# Patient Record
Sex: FEMALE | Race: WHITE | NOT HISPANIC OR LATINO
[De-identification: names, ages, dates, MRNs, and addresses within clinical notes are randomized per-mention and may not be internally consistent; named-entity substitution may affect disease eponyms.]

---

## 2017-02-10 ENCOUNTER — APPOINTMENT (OUTPATIENT)
Dept: DERMATOLOGY | Facility: CLINIC | Age: 22
End: 2017-02-10

## 2017-02-10 VITALS
SYSTOLIC BLOOD PRESSURE: 100 MMHG | HEIGHT: 64 IN | BODY MASS INDEX: 24.75 KG/M2 | DIASTOLIC BLOOD PRESSURE: 50 MMHG | WEIGHT: 145 LBS

## 2017-03-20 ENCOUNTER — MEDICATION RENEWAL (OUTPATIENT)
Age: 22
End: 2017-03-20

## 2017-04-06 ENCOUNTER — APPOINTMENT (OUTPATIENT)
Dept: DERMATOLOGY | Facility: CLINIC | Age: 22
End: 2017-04-06

## 2017-04-06 DIAGNOSIS — Z78.9 OTHER SPECIFIED HEALTH STATUS: ICD-10-CM

## 2017-04-06 RX ORDER — DOXYCYCLINE HYCLATE 100 MG/1
100 CAPSULE ORAL
Qty: 1 | Refills: 2 | Status: DISCONTINUED | COMMUNITY
Start: 2017-02-10 | End: 2017-04-06

## 2017-05-18 ENCOUNTER — RX RENEWAL (OUTPATIENT)
Age: 22
End: 2017-05-18

## 2017-05-19 ENCOUNTER — APPOINTMENT (OUTPATIENT)
Dept: INTERNAL MEDICINE | Facility: CLINIC | Age: 22
End: 2017-05-19

## 2017-05-19 VITALS
SYSTOLIC BLOOD PRESSURE: 100 MMHG | DIASTOLIC BLOOD PRESSURE: 60 MMHG | WEIGHT: 144 LBS | HEIGHT: 64 IN | BODY MASS INDEX: 24.59 KG/M2

## 2017-05-19 DIAGNOSIS — K92.1 MELENA: ICD-10-CM

## 2017-06-30 ENCOUNTER — APPOINTMENT (OUTPATIENT)
Dept: DERMATOLOGY | Facility: CLINIC | Age: 22
End: 2017-06-30

## 2017-06-30 VITALS — SYSTOLIC BLOOD PRESSURE: 110 MMHG | WEIGHT: 145 LBS | DIASTOLIC BLOOD PRESSURE: 62 MMHG

## 2017-06-30 DIAGNOSIS — L73.0 ACNE KELOID: ICD-10-CM

## 2017-06-30 RX ORDER — NORGESTIMATE AND ETHINYL ESTRADIOL 0.25-0.035
0.25-35 KIT ORAL
Qty: 28 | Refills: 0 | Status: ACTIVE | COMMUNITY
Start: 2017-02-17

## 2017-07-03 ENCOUNTER — EMERGENCY (EMERGENCY)
Facility: HOSPITAL | Age: 22
LOS: 1 days | Discharge: HOME | End: 2017-07-03
Attending: EMERGENCY MEDICINE | Admitting: EMERGENCY MEDICINE
Payer: COMMERCIAL

## 2017-07-03 ENCOUNTER — APPOINTMENT (OUTPATIENT)
Dept: INTERNAL MEDICINE | Facility: CLINIC | Age: 22
End: 2017-07-03

## 2017-07-03 VITALS
DIASTOLIC BLOOD PRESSURE: 70 MMHG | WEIGHT: 142 LBS | SYSTOLIC BLOOD PRESSURE: 102 MMHG | HEIGHT: 64 IN | HEART RATE: 90 BPM | TEMPERATURE: 98.9 F | BODY MASS INDEX: 24.24 KG/M2

## 2017-07-03 VITALS
SYSTOLIC BLOOD PRESSURE: 137 MMHG | DIASTOLIC BLOOD PRESSURE: 83 MMHG | OXYGEN SATURATION: 95 % | RESPIRATION RATE: 20 BRPM | HEART RATE: 114 BPM

## 2017-07-03 VITALS
DIASTOLIC BLOOD PRESSURE: 65 MMHG | OXYGEN SATURATION: 98 % | TEMPERATURE: 98 F | RESPIRATION RATE: 18 BRPM | SYSTOLIC BLOOD PRESSURE: 112 MMHG | HEART RATE: 86 BPM

## 2017-07-03 DIAGNOSIS — J06.9 ACUTE UPPER RESPIRATORY INFECTION, UNSPECIFIED: ICD-10-CM

## 2017-07-03 PROCEDURE — 99283 EMERGENCY DEPT VISIT LOW MDM: CPT | Mod: 25

## 2017-07-03 RX ORDER — DIPHENHYDRAMINE HCL 50 MG
25 CAPSULE ORAL ONCE
Refills: 0 | Status: COMPLETED | OUTPATIENT
Start: 2017-07-03 | End: 2017-07-03

## 2017-07-03 RX ORDER — IBUPROFEN 200 MG
400 TABLET ORAL ONCE
Refills: 0 | Status: COMPLETED | OUTPATIENT
Start: 2017-07-03 | End: 2017-07-03

## 2017-07-03 RX ADMIN — Medication 400 MILLIGRAM(S): at 03:32

## 2017-07-03 RX ADMIN — Medication 25 MILLIGRAM(S): at 03:32

## 2017-07-03 NOTE — ED PROVIDER NOTE - PHYSICAL EXAMINATION
Gen: NAD  Eyes:  sclerae white, no icterus  ENT: Moist mucous membranes. No exudates, clear oropharynx, mallampati 1  Neck: supple, no LAD, mass or goiter, trachea midline  CV: RRR. Audible S1 and S2. No murmurs, rubs, gallops, S3, nor S4  Pulm: Clear to auscultation bilaterally. No wheezes, rales, or rhonchi  Abd: BS+, nondistended, No tenderness to palpation  Musculoskeletal:  No edema  Skin: no lesions or scars noted  Psych: Anxious  Neurologic: AAOx3

## 2017-07-03 NOTE — ED ADULT NURSE NOTE - OBJECTIVE STATEMENT
Pt presents to ED awake, alert and ambulatory, accompanied by her  and her mother d/t difficulty swallowing. Pt states around 8pm yesterday, she took her birth control pill in a rush and with a tiny bit of water and afterwards felt like it was stuck in her throat. Pt states she hates taking pills and immediately afterwards felt very anxious and reportedly had a "panic attack." Pt denies respiratory distress at any time. Pt has been drinking warm liquid since without coughing or choking. Pt has normal skin color for race and respirations even and unlabored. Pt feels like her throat is "numb." No foreign body noted in throat. No PMH.

## 2017-07-03 NOTE — ED PROVIDER NOTE - ATTENDING CONTRIBUTION TO CARE
21yo F here w c/o painful swallowing. Accompanied by her  and her mother. Pt states around 8pm last night took her birth control pill with a tiny sip of water and afterwards felt like it was stuck in her throat. Here c/o sensation of something "stuck in her throat" and "unable to swallow" however, has been drinking warm liquid since and is tolerating. No sob, resp distress. No coughing or choking. On exam anxious but well appearing in NAD NCAT normal pharynx, uvula midline, no tonsillar hypertrophy or exudate. Neck supple, No thyromegaly. No LAD, RRR, no murmur, CTa BL. neuro intact. AP: 21yo F here with FB sensation in throat after swallowing her OCP earlier this evening. She is in no distress. Breathing comfortably. Tolerating PO at home and tolerating her own secretions. PO challenge. Reassurance. FU PCP.

## 2017-07-03 NOTE — ED PROVIDER NOTE - OBJECTIVE STATEMENT
21 y/o F p/w odynophagia; swallowed birth control pill earlier and pill feels stuck in her throat. Able to swallow and tolerate PO w/out difficulty. Started on Z- pack 3 days ago for nasal congestion.

## 2017-07-03 NOTE — ED PROVIDER NOTE - MEDICAL DECISION MAKING DETAILS
Odynophagia after swallowing birth control pill. Clear oropharynx. Given motrin, popsicle, and benadryl for anxiety. Odynophagia after swallowing birth control pill. Clear oropharynx. Tolerating secretions. Airway intact. No resp distress. Given motrin for pain, tolerated popsicle in ED, pt requested benadryl to help "relax." DC [facility] PCP.   Ana Cristina: See attending statement below

## 2017-07-04 ENCOUNTER — EMERGENCY (EMERGENCY)
Facility: HOSPITAL | Age: 22
LOS: 1 days | Discharge: HOME | End: 2017-07-04
Attending: EMERGENCY MEDICINE | Admitting: EMERGENCY MEDICINE
Payer: COMMERCIAL

## 2017-07-04 VITALS
TEMPERATURE: 99 F | SYSTOLIC BLOOD PRESSURE: 106 MMHG | HEART RATE: 69 BPM | OXYGEN SATURATION: 98 % | DIASTOLIC BLOOD PRESSURE: 76 MMHG | RESPIRATION RATE: 20 BRPM

## 2017-07-04 PROCEDURE — 99283 EMERGENCY DEPT VISIT LOW MDM: CPT | Mod: 25

## 2017-07-04 NOTE — ED PROVIDER NOTE - PLAN OF CARE
1) Please follow-up with your Primary Medical Doctor in 3-5 days. If you need to find a new physician, please call [phone]. If the symptoms continue, please see the ENT and you can call them at [phone].  2) Return to the Emergency Department if you experiences: fevers, chills, vomiting, or symptoms that are new or recurrent.  3) If you have any questions or concerns, do not hesitate to contact us at [phone].

## 2017-07-04 NOTE — ED ADULT NURSE NOTE - OBJECTIVE STATEMENT
21 yo female pt presents to ed complaining of difficulty swallowing for two days. as per pt "I was driving on sunday and swallowed my birth control pill with some water and I feel like it has been lodged in there since. I am scared to swallow and I feel like something is in my throat and I have only been eating liquids and soft foods". airway patent. no redness noted to throat. no angioedema. pt appears to be in no respiratory distress. speech clear. breath sounds clear and equal bilaterally. pt appear anxious. pt denies pain/numbness/tingling/sob/chest pain/n/v. abd nontender and nondistended. skin warm dry and intact.

## 2017-07-04 NOTE — ED ADULT NURSE NOTE - CHPI ED SYMPTOMS NEG
no numbness/no vomiting/no nausea/no tingling/no weakness/no chills/no decreased eating/drinking/no dizziness/no fever/no pain

## 2017-07-04 NOTE — ED PROVIDER NOTE - ATTENDING CONTRIBUTION TO CARE
Attending MD Gaines:  I personally have seen and examined this patient.  Resident note reviewed and agree on plan of care and except where noted.  See HPI, PE, and MDM for details.     Attending MD Gaines: A & O x 3, NAD, HEENT WNL and no facial asymmetry; lungs CTAB, heart with reg rhythm without murmur; abdomen soft NTND; extremities with no edema; neuro exam non focal with no motor or sensory deficits.       22F presenting with persistent odynophagia and difficulty swallowing, patient thinks there is a pill stuck in her throat. No e/o complete impaction, likely pill esophagitis vs anxiety as patient is quite anxious. Airway clear, no stridor, tolerating PO. Will tx with pepcid, maalox, GI outpatient f/u

## 2017-07-04 NOTE — ED PROVIDER NOTE - CARE PLAN
Instructions for follow-up, activity and diet:	1) Please follow-up with your Primary Medical Doctor in 3-5 days. If you need to find a new physician, please call [phone]. If the symptoms continue, please see the ENT and you can call them at [phone].  2) Return to the Emergency Department if you experiences: fevers, chills, vomiting, or symptoms that are new or recurrent.  3) If you have any questions or concerns, do not hesitate to contact us at [phone]. Principal Discharge DX:	Pill esophagitis  Instructions for follow-up, activity and diet:	1) Please follow-up with your Primary Medical Doctor in 3-5 days. If you need to find a new physician, please call [phone]. If the symptoms continue, please see the ENT and you can call them at [phone].  2) Return to the Emergency Department if you experiences: fevers, chills, vomiting, or symptoms that are new or recurrent.  3) If you have any questions or concerns, do not hesitate to contact us at [phone]. Principal Discharge DX:	Pill esophagitis  Instructions for follow-up, activity and diet:	1) Please follow-up with your Primary Medical Doctor in 3-5 days. If you need to find a new physician, please call [phone]. If the symptoms continue, please see the ENT and you can call them at [phone].  2) Return to the Emergency Department if you experiences: fevers, chills, vomiting, or symptoms that are new or recurrent.  3) If you have any questions or concerns, do not hesitate to contact us at [phone]. Principal Discharge DX:	Pill esophagitis  Instructions for follow-up, activity and diet:	1) Please follow-up with your Primary Medical Doctor in 3-5 days. If you need to find a new physician, please call [phone]. If the symptoms continue, please see the ENT and you can call them at [phone].  2) Return to the Emergency Department if you experiences: fevers, chills, vomiting, or symptoms that are new or recurrent.  3) If you have any questions or concerns, do not hesitate to contact us at [phone].

## 2017-07-04 NOTE — ED PROVIDER NOTE - MEDICAL DECISION MAKING DETAILS
Villa Rodriguez MD (resident): 22 F w/ diff swallowing after ingesting an OCP, able to tolerate solids and liquids with discomfort. Protecting airway, no sign of airway narrowing or swelling. Unable to visualize any lesions of upper oropharynx. Reassurance to patient and family members.

## 2017-07-04 NOTE — ED PROVIDER NOTE - NS ED ROS FT
Review of Systems: No fever, no chest pain, no shortness of breath, + throat discomfort. ~ Villa Rodriguez MD

## 2017-07-04 NOTE — ED PROVIDER NOTE - PHYSICAL EXAMINATION
Physical Exam: young F who is anxious-appearing, tearful in the room, AAOx3, NCAT, MMM, no oropharyngeal exudates, signs of trauma or swelling, no stridor, neck is supple, PERRL, CTAB, normal rate and regular rhythm,  No deformity of extremities, No rashes, CN grossly intact, No focal motor or sensory deficits. ~ Villa Rodriguez MD

## 2017-07-04 NOTE — ED PROVIDER NOTE - OBJECTIVE STATEMENT
3 days ago pt swallowed a birth control pill and since then has been having foreign body sensation in the through, and discomfort with swallowing both solids and liquids, but is able to tolerate eating and drinking w/o vomiting, cough, drooling. No voice changes, no SOB.

## 2017-07-05 VITALS
SYSTOLIC BLOOD PRESSURE: 101 MMHG | HEART RATE: 69 BPM | OXYGEN SATURATION: 100 % | TEMPERATURE: 98 F | DIASTOLIC BLOOD PRESSURE: 67 MMHG | RESPIRATION RATE: 20 BRPM

## 2017-07-05 RX ORDER — FAMOTIDINE 10 MG/ML
20 INJECTION INTRAVENOUS ONCE
Refills: 0 | Status: DISCONTINUED | OUTPATIENT
Start: 2017-07-05 | End: 2017-07-05

## 2017-07-05 RX ORDER — ACETAMINOPHEN 500 MG
650 TABLET ORAL ONCE
Refills: 0 | Status: COMPLETED | OUTPATIENT
Start: 2017-07-05 | End: 2017-07-05

## 2017-07-05 RX ORDER — FAMOTIDINE 10 MG/ML
5 INJECTION INTRAVENOUS
Qty: 25 | Refills: 0
Start: 2017-07-05 | End: 2017-07-10

## 2017-07-05 RX ADMIN — Medication 30 MILLILITER(S): at 00:28

## 2017-07-05 RX ADMIN — Medication 650 MILLIGRAM(S): at 00:28

## 2017-07-20 ENCOUNTER — APPOINTMENT (OUTPATIENT)
Dept: INTERNAL MEDICINE | Facility: CLINIC | Age: 22
End: 2017-07-20

## 2017-07-26 ENCOUNTER — APPOINTMENT (OUTPATIENT)
Dept: ENDOCRINOLOGY | Facility: CLINIC | Age: 22
End: 2017-07-26

## 2017-07-26 VITALS
OXYGEN SATURATION: 99 % | HEIGHT: 64 IN | DIASTOLIC BLOOD PRESSURE: 60 MMHG | HEART RATE: 93 BPM | BODY MASS INDEX: 25.61 KG/M2 | WEIGHT: 150 LBS | SYSTOLIC BLOOD PRESSURE: 110 MMHG

## 2017-08-01 ENCOUNTER — APPOINTMENT (OUTPATIENT)
Dept: OTOLARYNGOLOGY | Facility: CLINIC | Age: 22
End: 2017-08-01

## 2017-10-18 ENCOUNTER — TRANSCRIPTION ENCOUNTER (OUTPATIENT)
Age: 22
End: 2017-10-18

## 2017-11-21 ENCOUNTER — APPOINTMENT (OUTPATIENT)
Dept: DERMATOLOGY | Facility: CLINIC | Age: 22
End: 2017-11-21

## 2017-12-13 ENCOUNTER — APPOINTMENT (OUTPATIENT)
Dept: DERMATOLOGY | Facility: CLINIC | Age: 22
End: 2017-12-13
Payer: MEDICAID

## 2017-12-13 DIAGNOSIS — L21.9 SEBORRHEIC DERMATITIS, UNSPECIFIED: ICD-10-CM

## 2017-12-13 DIAGNOSIS — B00.1 HERPESVIRAL VESICULAR DERMATITIS: ICD-10-CM

## 2017-12-13 PROCEDURE — 99213 OFFICE O/P EST LOW 20 MIN: CPT

## 2017-12-13 RX ORDER — CLINDAMYCIN PHOSPHATE AND TRETINOIN 10; .25 MG/G; MG/G
1.2-0.025 GEL TOPICAL
Qty: 1 | Refills: 6 | Status: COMPLETED | COMMUNITY
Start: 2017-04-06 | End: 2017-12-13

## 2017-12-13 RX ORDER — KETOCONAZOLE 20.5 MG/ML
2 SHAMPOO, SUSPENSION TOPICAL
Qty: 1 | Refills: 5 | Status: ACTIVE | COMMUNITY
Start: 2017-12-13 | End: 1900-01-01

## 2017-12-13 RX ORDER — TRETINOIN 0.25 MG/G
0.03 CREAM TOPICAL
Qty: 1 | Refills: 3 | Status: COMPLETED | COMMUNITY
Start: 2017-02-10 | End: 2017-12-13

## 2017-12-13 RX ORDER — CLINDAMYCIN PHOSPHATE 1 G/10ML
1 GEL TOPICAL
Qty: 1 | Refills: 11 | Status: COMPLETED | COMMUNITY
Start: 2017-02-10 | End: 2017-12-13

## 2017-12-13 RX ORDER — ADAPALENE AND BENZOYL PEROXIDE 3; 25 MG/G; MG/G
0.3-2.5 GEL TOPICAL
Qty: 1 | Refills: 3 | Status: COMPLETED | COMMUNITY
Start: 2017-02-10 | End: 2017-12-13

## 2017-12-13 RX ORDER — TRETINOIN 0.5 MG/G
0.05 CREAM TOPICAL
Qty: 1 | Refills: 4 | Status: ACTIVE | COMMUNITY
Start: 2017-12-13 | End: 1900-01-01

## 2018-01-15 ENCOUNTER — RX RENEWAL (OUTPATIENT)
Age: 23
End: 2018-01-15

## 2018-01-18 ENCOUNTER — APPOINTMENT (OUTPATIENT)
Dept: ENDOCRINOLOGY | Facility: CLINIC | Age: 23
End: 2018-01-18

## 2018-02-27 ENCOUNTER — RESULT REVIEW (OUTPATIENT)
Age: 23
End: 2018-02-27

## 2018-03-11 ENCOUNTER — TRANSCRIPTION ENCOUNTER (OUTPATIENT)
Age: 23
End: 2018-03-11

## 2018-04-05 ENCOUNTER — RX RENEWAL (OUTPATIENT)
Age: 23
End: 2018-04-05

## 2018-04-18 ENCOUNTER — APPOINTMENT (OUTPATIENT)
Dept: ENDOCRINOLOGY | Facility: CLINIC | Age: 23
End: 2018-04-18
Payer: COMMERCIAL

## 2018-04-18 VITALS
DIASTOLIC BLOOD PRESSURE: 80 MMHG | SYSTOLIC BLOOD PRESSURE: 110 MMHG | HEART RATE: 92 BPM | OXYGEN SATURATION: 98 % | WEIGHT: 171 LBS | HEIGHT: 64 IN | BODY MASS INDEX: 29.19 KG/M2

## 2018-04-18 PROCEDURE — 99215 OFFICE O/P EST HI 40 MIN: CPT

## 2018-04-18 RX ORDER — VALACYCLOVIR 1 G/1
1 TABLET, FILM COATED ORAL
Qty: 4 | Refills: 2 | Status: DISCONTINUED | COMMUNITY
Start: 2017-12-13 | End: 2018-04-18

## 2018-04-19 LAB
T4 FREE SERPL-MCNC: 1.1 NG/DL
TSH SERPL-ACNC: 0.62 UIU/ML

## 2018-04-27 ENCOUNTER — RX RENEWAL (OUTPATIENT)
Age: 23
End: 2018-04-27

## 2018-04-27 RX ORDER — LEVOTHYROXINE SODIUM 100 UG/1
100 TABLET ORAL
Qty: 1 | Refills: 1 | Status: ACTIVE | COMMUNITY
Start: 2017-05-18 | End: 1900-01-01

## 2018-05-21 ENCOUNTER — OUTPATIENT (OUTPATIENT)
Dept: OUTPATIENT SERVICES | Facility: HOSPITAL | Age: 23
LOS: 1 days | End: 2018-05-21
Payer: SELF-PAY

## 2018-05-21 DIAGNOSIS — O26.899 OTHER SPECIFIED PREGNANCY RELATED CONDITIONS, UNSPECIFIED TRIMESTER: ICD-10-CM

## 2018-05-21 DIAGNOSIS — Z3A.00 WEEKS OF GESTATION OF PREGNANCY NOT SPECIFIED: ICD-10-CM

## 2018-05-22 PROCEDURE — G0463: CPT

## 2018-05-22 PROCEDURE — 59025 FETAL NON-STRESS TEST: CPT

## 2018-07-26 PROBLEM — Z78.9 ALCOHOL USE: Status: ACTIVE | Noted: 2017-02-10

## 2018-08-27 ENCOUNTER — INPATIENT (INPATIENT)
Facility: HOSPITAL | Age: 23
LOS: 2 days | Discharge: HOME | End: 2018-08-30
Attending: OBSTETRICS & GYNECOLOGY | Admitting: OBSTETRICS & GYNECOLOGY
Payer: COMMERCIAL

## 2018-08-27 VITALS — WEIGHT: 198.42 LBS | HEIGHT: 64 IN

## 2018-08-27 DIAGNOSIS — O26.899 OTHER SPECIFIED PREGNANCY RELATED CONDITIONS, UNSPECIFIED TRIMESTER: ICD-10-CM

## 2018-08-27 DIAGNOSIS — Z3A.00 WEEKS OF GESTATION OF PREGNANCY NOT SPECIFIED: ICD-10-CM

## 2018-08-27 DIAGNOSIS — Z34.80 ENCOUNTER FOR SUPERVISION OF OTHER NORMAL PREGNANCY, UNSPECIFIED TRIMESTER: ICD-10-CM

## 2018-08-27 LAB
BASOPHILS # BLD AUTO: 0.1 K/UL — SIGNIFICANT CHANGE UP (ref 0–0.2)
BASOPHILS NFR BLD AUTO: 0.6 % — SIGNIFICANT CHANGE UP (ref 0–2)
BLD GP AB SCN SERPL QL: NEGATIVE — SIGNIFICANT CHANGE UP
EOSINOPHIL # BLD AUTO: 0.1 K/UL — SIGNIFICANT CHANGE UP (ref 0–0.5)
EOSINOPHIL NFR BLD AUTO: 0.6 % — SIGNIFICANT CHANGE UP (ref 0–6)
HCT VFR BLD CALC: 32.3 % — LOW (ref 34.5–45)
HGB BLD-MCNC: 10.4 G/DL — LOW (ref 11.5–15.5)
LYMPHOCYTES # BLD AUTO: 2.2 K/UL — SIGNIFICANT CHANGE UP (ref 1–3.3)
LYMPHOCYTES # BLD AUTO: 21.6 % — SIGNIFICANT CHANGE UP (ref 13–44)
MCHC RBC-ENTMCNC: 27 PG — SIGNIFICANT CHANGE UP (ref 27–34)
MCHC RBC-ENTMCNC: 32.2 GM/DL — SIGNIFICANT CHANGE UP (ref 32–36)
MCV RBC AUTO: 83.7 FL — SIGNIFICANT CHANGE UP (ref 80–100)
MONOCYTES # BLD AUTO: 0.8 K/UL — SIGNIFICANT CHANGE UP (ref 0–0.9)
MONOCYTES NFR BLD AUTO: 8.4 % — SIGNIFICANT CHANGE UP (ref 2–14)
NEUTROPHILS # BLD AUTO: 6.8 K/UL — SIGNIFICANT CHANGE UP (ref 1.8–7.4)
NEUTROPHILS NFR BLD AUTO: 68.8 % — SIGNIFICANT CHANGE UP (ref 43–77)
PLATELET # BLD AUTO: 360 K/UL — SIGNIFICANT CHANGE UP (ref 150–400)
RBC # BLD: 3.85 M/UL — SIGNIFICANT CHANGE UP (ref 3.8–5.2)
RBC # FLD: 14.8 % — HIGH (ref 10.3–14.5)
RH IG SCN BLD-IMP: NEGATIVE — SIGNIFICANT CHANGE UP
WBC # BLD: 10 K/UL — SIGNIFICANT CHANGE UP (ref 3.8–10.5)
WBC # FLD AUTO: 10 K/UL — SIGNIFICANT CHANGE UP (ref 3.8–10.5)

## 2018-08-27 RX ORDER — SODIUM CHLORIDE 9 MG/ML
1000 INJECTION, SOLUTION INTRAVENOUS ONCE
Qty: 0 | Refills: 0 | Status: DISCONTINUED | OUTPATIENT
Start: 2018-08-27 | End: 2018-08-28

## 2018-08-27 RX ORDER — OXYTOCIN 10 UNIT/ML
4 VIAL (ML) INJECTION
Qty: 30 | Refills: 0 | Status: DISCONTINUED | OUTPATIENT
Start: 2018-08-27 | End: 2018-08-30

## 2018-08-27 RX ORDER — OXYTOCIN 10 UNIT/ML
333.33 VIAL (ML) INJECTION
Qty: 20 | Refills: 0 | Status: DISCONTINUED | OUTPATIENT
Start: 2018-08-27 | End: 2018-08-28

## 2018-08-27 RX ORDER — CITRIC ACID/SODIUM CITRATE 300-500 MG
15 SOLUTION, ORAL ORAL EVERY 4 HOURS
Qty: 0 | Refills: 0 | Status: DISCONTINUED | OUTPATIENT
Start: 2018-08-27 | End: 2018-08-28

## 2018-08-27 RX ORDER — SODIUM CHLORIDE 9 MG/ML
1000 INJECTION, SOLUTION INTRAVENOUS
Qty: 0 | Refills: 0 | Status: DISCONTINUED | OUTPATIENT
Start: 2018-08-27 | End: 2018-08-28

## 2018-08-27 RX ADMIN — Medication 4 MILLIUNIT(S)/MIN: at 17:38

## 2018-08-27 RX ADMIN — Medication 0.25 MILLIGRAM(S): at 17:48

## 2018-08-28 LAB — T PALLIDUM AB TITR SER: NEGATIVE — SIGNIFICANT CHANGE UP

## 2018-08-28 RX ORDER — OXYCODONE HYDROCHLORIDE 5 MG/1
5 TABLET ORAL EVERY 4 HOURS
Qty: 0 | Refills: 0 | Status: DISCONTINUED | OUTPATIENT
Start: 2018-08-28 | End: 2018-08-30

## 2018-08-28 RX ORDER — SODIUM CHLORIDE 9 MG/ML
1000 INJECTION, SOLUTION INTRAVENOUS ONCE
Qty: 0 | Refills: 0 | Status: COMPLETED | OUTPATIENT
Start: 2018-08-28 | End: 2018-08-28

## 2018-08-28 RX ORDER — DIBUCAINE 1 %
1 OINTMENT (GRAM) RECTAL EVERY 4 HOURS
Qty: 0 | Refills: 0 | Status: DISCONTINUED | OUTPATIENT
Start: 2018-08-28 | End: 2018-08-30

## 2018-08-28 RX ORDER — ACETAMINOPHEN 500 MG
975 TABLET ORAL EVERY 6 HOURS
Qty: 0 | Refills: 0 | Status: COMPLETED | OUTPATIENT
Start: 2018-08-28 | End: 2019-07-27

## 2018-08-28 RX ORDER — SODIUM CHLORIDE 9 MG/ML
3 INJECTION INTRAMUSCULAR; INTRAVENOUS; SUBCUTANEOUS EVERY 8 HOURS
Qty: 0 | Refills: 0 | Status: DISCONTINUED | OUTPATIENT
Start: 2018-08-28 | End: 2018-08-28

## 2018-08-28 RX ORDER — KETOROLAC TROMETHAMINE 30 MG/ML
30 SYRINGE (ML) INJECTION ONCE
Qty: 0 | Refills: 0 | Status: DISCONTINUED | OUTPATIENT
Start: 2018-08-28 | End: 2018-08-28

## 2018-08-28 RX ORDER — LANOLIN
1 OINTMENT (GRAM) TOPICAL EVERY 6 HOURS
Qty: 0 | Refills: 0 | Status: DISCONTINUED | OUTPATIENT
Start: 2018-08-28 | End: 2018-08-30

## 2018-08-28 RX ORDER — IBUPROFEN 200 MG
600 TABLET ORAL EVERY 6 HOURS
Qty: 0 | Refills: 0 | Status: DISCONTINUED | OUTPATIENT
Start: 2018-08-28 | End: 2018-08-30

## 2018-08-28 RX ORDER — IBUPROFEN 200 MG
600 TABLET ORAL EVERY 6 HOURS
Qty: 0 | Refills: 0 | Status: COMPLETED | OUTPATIENT
Start: 2018-08-28 | End: 2019-07-27

## 2018-08-28 RX ORDER — OXYTOCIN 10 UNIT/ML
41.67 VIAL (ML) INJECTION
Qty: 20 | Refills: 0 | Status: DISCONTINUED | OUTPATIENT
Start: 2018-08-28 | End: 2018-08-30

## 2018-08-28 RX ORDER — DIBUCAINE 1 %
1 OINTMENT (GRAM) RECTAL EVERY 4 HOURS
Qty: 0 | Refills: 0 | Status: DISCONTINUED | OUTPATIENT
Start: 2018-08-28 | End: 2018-08-28

## 2018-08-28 RX ORDER — SIMETHICONE 80 MG/1
80 TABLET, CHEWABLE ORAL EVERY 6 HOURS
Qty: 0 | Refills: 0 | Status: DISCONTINUED | OUTPATIENT
Start: 2018-08-28 | End: 2018-08-30

## 2018-08-28 RX ORDER — TETANUS TOXOID, REDUCED DIPHTHERIA TOXOID AND ACELLULAR PERTUSSIS VACCINE, ADSORBED 5; 2.5; 8; 8; 2.5 [IU]/.5ML; [IU]/.5ML; UG/.5ML; UG/.5ML; UG/.5ML
0.5 SUSPENSION INTRAMUSCULAR ONCE
Qty: 0 | Refills: 0 | Status: DISCONTINUED | OUTPATIENT
Start: 2018-08-28 | End: 2018-08-30

## 2018-08-28 RX ORDER — AER TRAVELER 0.5 G/1
1 SOLUTION RECTAL; TOPICAL EVERY 4 HOURS
Qty: 0 | Refills: 0 | Status: DISCONTINUED | OUTPATIENT
Start: 2018-08-28 | End: 2018-08-30

## 2018-08-28 RX ORDER — OXYTOCIN 10 UNIT/ML
41.67 VIAL (ML) INJECTION
Qty: 20 | Refills: 0 | Status: DISCONTINUED | OUTPATIENT
Start: 2018-08-28 | End: 2018-08-28

## 2018-08-28 RX ORDER — LEVOTHYROXINE SODIUM 125 MCG
100 TABLET ORAL DAILY
Qty: 0 | Refills: 0 | Status: DISCONTINUED | OUTPATIENT
Start: 2018-08-28 | End: 2018-08-30

## 2018-08-28 RX ORDER — KETOCONAZOLE 20 MG/G
1 AEROSOL, FOAM TOPICAL
Qty: 0 | Refills: 0 | Status: DISCONTINUED | OUTPATIENT
Start: 2018-08-28 | End: 2018-08-30

## 2018-08-28 RX ORDER — DIPHENHYDRAMINE HCL 50 MG
25 CAPSULE ORAL EVERY 6 HOURS
Qty: 0 | Refills: 0 | Status: DISCONTINUED | OUTPATIENT
Start: 2018-08-28 | End: 2018-08-30

## 2018-08-28 RX ORDER — MAGNESIUM HYDROXIDE 400 MG/1
30 TABLET, CHEWABLE ORAL
Qty: 0 | Refills: 0 | Status: DISCONTINUED | OUTPATIENT
Start: 2018-08-28 | End: 2018-08-30

## 2018-08-28 RX ORDER — DOCUSATE SODIUM 100 MG
100 CAPSULE ORAL
Qty: 0 | Refills: 0 | Status: DISCONTINUED | OUTPATIENT
Start: 2018-08-28 | End: 2018-08-30

## 2018-08-28 RX ORDER — HYDROCORTISONE 1 %
1 OINTMENT (GRAM) TOPICAL EVERY 4 HOURS
Qty: 0 | Refills: 0 | Status: DISCONTINUED | OUTPATIENT
Start: 2018-08-28 | End: 2018-08-28

## 2018-08-28 RX ORDER — SODIUM CHLORIDE 9 MG/ML
3 INJECTION INTRAMUSCULAR; INTRAVENOUS; SUBCUTANEOUS EVERY 8 HOURS
Qty: 0 | Refills: 0 | Status: DISCONTINUED | OUTPATIENT
Start: 2018-08-28 | End: 2018-08-30

## 2018-08-28 RX ORDER — HYDROCORTISONE 1 %
1 OINTMENT (GRAM) TOPICAL EVERY 4 HOURS
Qty: 0 | Refills: 0 | Status: DISCONTINUED | OUTPATIENT
Start: 2018-08-28 | End: 2018-08-30

## 2018-08-28 RX ORDER — PRAMOXINE HYDROCHLORIDE 150 MG/15G
1 AEROSOL, FOAM RECTAL EVERY 4 HOURS
Qty: 0 | Refills: 0 | Status: DISCONTINUED | OUTPATIENT
Start: 2018-08-28 | End: 2018-08-30

## 2018-08-28 RX ORDER — AER TRAVELER 0.5 G/1
1 SOLUTION RECTAL; TOPICAL EVERY 4 HOURS
Qty: 0 | Refills: 0 | Status: DISCONTINUED | OUTPATIENT
Start: 2018-08-28 | End: 2018-08-28

## 2018-08-28 RX ORDER — ACETAMINOPHEN 500 MG
975 TABLET ORAL EVERY 6 HOURS
Qty: 0 | Refills: 0 | Status: DISCONTINUED | OUTPATIENT
Start: 2018-08-28 | End: 2018-08-30

## 2018-08-28 RX ORDER — OXYCODONE HYDROCHLORIDE 5 MG/1
5 TABLET ORAL
Qty: 0 | Refills: 0 | Status: DISCONTINUED | OUTPATIENT
Start: 2018-08-28 | End: 2018-08-30

## 2018-08-28 RX ORDER — PRAMOXINE HYDROCHLORIDE 150 MG/15G
1 AEROSOL, FOAM RECTAL EVERY 4 HOURS
Qty: 0 | Refills: 0 | Status: DISCONTINUED | OUTPATIENT
Start: 2018-08-28 | End: 2018-08-28

## 2018-08-28 RX ORDER — GLYCERIN ADULT
1 SUPPOSITORY, RECTAL RECTAL AT BEDTIME
Qty: 0 | Refills: 0 | Status: DISCONTINUED | OUTPATIENT
Start: 2018-08-28 | End: 2018-08-30

## 2018-08-28 RX ADMIN — Medication 600 MILLIGRAM(S): at 21:45

## 2018-08-28 RX ADMIN — SODIUM CHLORIDE 2000 MILLILITER(S): 9 INJECTION, SOLUTION INTRAVENOUS at 03:00

## 2018-08-28 RX ADMIN — Medication 100 MICROGRAM(S): at 10:05

## 2018-08-28 RX ADMIN — Medication 975 MILLIGRAM(S): at 14:21

## 2018-08-28 RX ADMIN — Medication 125 MILLIUNIT(S)/MIN: at 00:06

## 2018-08-28 RX ADMIN — Medication 600 MILLIGRAM(S): at 11:15

## 2018-08-28 RX ADMIN — Medication 975 MILLIGRAM(S): at 15:00

## 2018-08-28 RX ADMIN — Medication 600 MILLIGRAM(S): at 20:45

## 2018-08-28 RX ADMIN — Medication 30 MILLIGRAM(S): at 03:27

## 2018-08-28 RX ADMIN — Medication 30 MILLIGRAM(S): at 02:45

## 2018-08-28 RX ADMIN — Medication 600 MILLIGRAM(S): at 10:27

## 2018-08-28 NOTE — CHART NOTE - NSCHARTNOTEFT_GEN_A_CORE
Pt seen and evaluated for rash on chest. Pt reports she noted this rash while pregnant after being out in the sun. It is localized to the chest. Pt reports it was previously itchy but now it just bothering her cosmetically.   On exam, pt has multiple small hypopigmented patches on the chest, does not extend to the extremities or abdomen. No erythema.     A&P: appears to be tinea versicolor  -Ketoconazole cream to be applied BID    D/w Dr. Celina Lee PGY-1

## 2018-08-28 NOTE — PROVIDER CONTACT NOTE (OTHER) - SITUATION
Pt s/p  with BP of 78/40, repeat BP 82/46. Moderate amount of bleeding, fundus assessment above umbilicus. MD Gaitan notified.

## 2018-08-28 NOTE — PROVIDER CONTACT NOTE (OTHER) - ACTION/TREATMENT ORDERED:
Order for 1L bolus of LR. Pt unable to void on own, straight cath done, 300ml clear, yellow urine. /59. Order for 1L bolus of LR. Pt unable to void on own, straight cath done, 300ml clear, yellow urine. /59. MD Gaitan aware.

## 2018-08-28 NOTE — CHART NOTE - NSCHARTNOTEFT_GEN_A_CORE
Pt seen and evaluated for hypotension in the recovery room, 74/45. Pt s/p vaginal delivery, . Pt reports feeling tired, but not more so than immediately after delivery. Denies lightheadedness or dizziness. Fundus is firm, felt 2cm above umbilicus in the midline, mild bleeding with pressure. Pt has not yet urinated since delivery, bedside sono revealed distended bladder.    -1L LR bolus  -Pt to urinate, straight cath if unable  -BPs Q15min  -Will reassess after bolus    D/w Dr. Lion PGY-3  Jesus PGY-1 Pt seen and evaluated for hypotension in the recovery room, BP 74/45, HR66. Pt s/p vaginal delivery, . Pt reports feeling tired, but not more so than immediately after delivery. Denies lightheadedness or dizziness. Fundus is firm, felt 2cm above umbilicus in the midline, mild bleeding with pressure. Pt has not yet urinated since delivery, bedside sono revealed distended bladder.    -1L LR bolus  -Pt to urinate, straight cath if unable  -BPs Q15min  -Will reassess after bolus    D/w Dr. Lion PGY-3  Jesus PGY-1

## 2018-08-29 LAB — KLEIHAUER-BETKE CALCULATION: 0 % — SIGNIFICANT CHANGE UP (ref 0–0.3)

## 2018-08-29 RX ADMIN — KETOCONAZOLE 1 APPLICATION(S): 20 AEROSOL, FOAM TOPICAL at 18:19

## 2018-08-29 RX ADMIN — Medication 600 MILLIGRAM(S): at 10:00

## 2018-08-29 RX ADMIN — Medication 600 MILLIGRAM(S): at 16:10

## 2018-08-29 RX ADMIN — KETOCONAZOLE 1 APPLICATION(S): 20 AEROSOL, FOAM TOPICAL at 09:22

## 2018-08-29 RX ADMIN — Medication 600 MILLIGRAM(S): at 09:17

## 2018-08-29 RX ADMIN — Medication 100 MICROGRAM(S): at 09:23

## 2018-08-29 RX ADMIN — Medication 600 MILLIGRAM(S): at 15:24

## 2018-08-29 RX ADMIN — MAGNESIUM HYDROXIDE 30 MILLILITER(S): 400 TABLET, CHEWABLE ORAL at 17:23

## 2018-08-29 NOTE — PROGRESS NOTE ADULT - ASSESSMENT
A/P:  23y  PPD # 1   S/P      with second degree laceration     Doing Well    PMHx:  Current Issues: Tinea versicolor A/P:  23y  PPD # 1   S/P      with second degree laceration     Doing Well    PMHx:  Current Issues: Tinea versicolor; RH neg, Baby RH pos, for Rhogam, check KB

## 2018-08-30 ENCOUNTER — TRANSCRIPTION ENCOUNTER (OUTPATIENT)
Age: 23
End: 2018-08-30

## 2018-08-30 VITALS
TEMPERATURE: 99 F | OXYGEN SATURATION: 100 % | HEART RATE: 89 BPM | SYSTOLIC BLOOD PRESSURE: 103 MMHG | RESPIRATION RATE: 18 BRPM | DIASTOLIC BLOOD PRESSURE: 65 MMHG

## 2018-08-30 LAB
BASOPHILS # BLD AUTO: 0.1 K/UL — SIGNIFICANT CHANGE UP (ref 0–0.2)
BASOPHILS NFR BLD AUTO: 0.7 % — SIGNIFICANT CHANGE UP (ref 0–2)
EOSINOPHIL # BLD AUTO: 0.2 K/UL — SIGNIFICANT CHANGE UP (ref 0–0.5)
EOSINOPHIL NFR BLD AUTO: 2.3 % — SIGNIFICANT CHANGE UP (ref 0–6)
HCT VFR BLD CALC: 20.5 % — CRITICAL LOW (ref 34.5–45)
HCT VFR BLD CALC: 22.2 % — LOW (ref 34.5–45)
HCT VFR BLD CALC: 22.6 % — LOW (ref 34.5–45)
HGB BLD-MCNC: 6.5 G/DL — CRITICAL LOW (ref 11.5–15.5)
HGB BLD-MCNC: 6.6 G/DL — CRITICAL LOW (ref 11.5–15.5)
HGB BLD-MCNC: 7.2 G/DL — LOW (ref 11.5–15.5)
LYMPHOCYTES # BLD AUTO: 2.4 K/UL — SIGNIFICANT CHANGE UP (ref 1–3.3)
LYMPHOCYTES # BLD AUTO: 24.3 % — SIGNIFICANT CHANGE UP (ref 13–44)
MCHC RBC-ENTMCNC: 27.1 PG — SIGNIFICANT CHANGE UP (ref 27–34)
MCHC RBC-ENTMCNC: 27.1 PG — SIGNIFICANT CHANGE UP (ref 27–34)
MCHC RBC-ENTMCNC: 31.9 GM/DL — LOW (ref 32–36)
MCHC RBC-ENTMCNC: 32 GM/DL — SIGNIFICANT CHANGE UP (ref 32–36)
MCV RBC AUTO: 84.6 FL — SIGNIFICANT CHANGE UP (ref 80–100)
MCV RBC AUTO: 84.9 FL — SIGNIFICANT CHANGE UP (ref 80–100)
MONOCYTES # BLD AUTO: 0.7 K/UL — SIGNIFICANT CHANGE UP (ref 0–0.9)
MONOCYTES NFR BLD AUTO: 7.6 % — SIGNIFICANT CHANGE UP (ref 2–14)
NEUTROPHILS # BLD AUTO: 6.3 K/UL — SIGNIFICANT CHANGE UP (ref 1.8–7.4)
NEUTROPHILS NFR BLD AUTO: 65.1 % — SIGNIFICANT CHANGE UP (ref 43–77)
PLATELET # BLD AUTO: 303 K/UL — SIGNIFICANT CHANGE UP (ref 150–400)
PLATELET # BLD AUTO: 305 K/UL — SIGNIFICANT CHANGE UP (ref 150–400)
RBC # BLD: 2.42 M/UL — LOW (ref 3.8–5.2)
RBC # BLD: 2.67 M/UL — LOW (ref 3.8–5.2)
RBC # FLD: 14.1 % — SIGNIFICANT CHANGE UP (ref 10.3–14.5)
RBC # FLD: 14.6 % — HIGH (ref 10.3–14.5)
WBC # BLD: 11 K/UL — HIGH (ref 3.8–10.5)
WBC # BLD: 9.7 K/UL — SIGNIFICANT CHANGE UP (ref 3.8–10.5)
WBC # FLD AUTO: 11 K/UL — HIGH (ref 3.8–10.5)
WBC # FLD AUTO: 9.7 K/UL — SIGNIFICANT CHANGE UP (ref 3.8–10.5)

## 2018-08-30 PROCEDURE — 86780 TREPONEMA PALLIDUM: CPT

## 2018-08-30 PROCEDURE — 85018 HEMOGLOBIN: CPT

## 2018-08-30 PROCEDURE — G0463: CPT

## 2018-08-30 PROCEDURE — 86900 BLOOD TYPING SEROLOGIC ABO: CPT

## 2018-08-30 PROCEDURE — 86901 BLOOD TYPING SEROLOGIC RH(D): CPT

## 2018-08-30 PROCEDURE — 59050 FETAL MONITOR W/REPORT: CPT

## 2018-08-30 PROCEDURE — 59025 FETAL NON-STRESS TEST: CPT

## 2018-08-30 PROCEDURE — 85460 HEMOGLOBIN FETAL: CPT

## 2018-08-30 PROCEDURE — 85014 HEMATOCRIT: CPT

## 2018-08-30 PROCEDURE — 86850 RBC ANTIBODY SCREEN: CPT

## 2018-08-30 PROCEDURE — 85027 COMPLETE CBC AUTOMATED: CPT

## 2018-08-30 RX ORDER — DOCUSATE SODIUM 100 MG
1 CAPSULE ORAL
Qty: 90 | Refills: 0
Start: 2018-08-30 | End: 2018-09-28

## 2018-08-30 RX ORDER — ASCORBIC ACID 60 MG
500 TABLET,CHEWABLE ORAL DAILY
Qty: 0 | Refills: 0 | Status: DISCONTINUED | OUTPATIENT
Start: 2018-08-30 | End: 2018-08-30

## 2018-08-30 RX ORDER — DOCUSATE SODIUM 100 MG
100 CAPSULE ORAL THREE TIMES A DAY
Qty: 0 | Refills: 0 | Status: DISCONTINUED | OUTPATIENT
Start: 2018-08-30 | End: 2018-08-30

## 2018-08-30 RX ORDER — IBUPROFEN 200 MG
1 TABLET ORAL
Qty: 0 | Refills: 0 | DISCHARGE
Start: 2018-08-30

## 2018-08-30 RX ORDER — ASCORBIC ACID 60 MG
1 TABLET,CHEWABLE ORAL
Qty: 90 | Refills: 0
Start: 2018-08-30

## 2018-08-30 RX ORDER — FERROUS SULFATE 325(65) MG
1 TABLET ORAL
Qty: 90 | Refills: 0
Start: 2018-08-30 | End: 2018-09-28

## 2018-08-30 RX ORDER — LEVOTHYROXINE SODIUM 125 MCG
1 TABLET ORAL
Qty: 0 | Refills: 0 | DISCHARGE
Start: 2018-08-30

## 2018-08-30 RX ORDER — FERROUS SULFATE 325(65) MG
325 TABLET ORAL THREE TIMES A DAY
Qty: 0 | Refills: 0 | Status: DISCONTINUED | OUTPATIENT
Start: 2018-08-30 | End: 2018-08-30

## 2018-08-30 RX ORDER — ACETAMINOPHEN 500 MG
3 TABLET ORAL
Qty: 0 | Refills: 0 | DISCHARGE
Start: 2018-08-30

## 2018-08-30 RX ORDER — ASCORBIC ACID 60 MG
1 TABLET,CHEWABLE ORAL
Qty: 0 | Refills: 0 | DISCHARGE
Start: 2018-08-30

## 2018-08-30 RX ADMIN — Medication 600 MILLIGRAM(S): at 14:46

## 2018-08-30 RX ADMIN — Medication 100 MICROGRAM(S): at 05:41

## 2018-08-30 RX ADMIN — MAGNESIUM HYDROXIDE 30 MILLILITER(S): 400 TABLET, CHEWABLE ORAL at 10:50

## 2018-08-30 RX ADMIN — Medication 500 MILLIGRAM(S): at 10:11

## 2018-08-30 RX ADMIN — Medication 600 MILLIGRAM(S): at 14:21

## 2018-08-30 RX ADMIN — KETOCONAZOLE 1 APPLICATION(S): 20 AEROSOL, FOAM TOPICAL at 05:40

## 2018-08-30 RX ADMIN — Medication 325 MILLIGRAM(S): at 10:02

## 2018-08-30 RX ADMIN — Medication 325 MILLIGRAM(S): at 14:21

## 2018-08-30 NOTE — DISCHARGE NOTE OB - PATIENT PORTAL LINK FT
You can access the [facility] Patient Portal, offered by [facility], by registering with the following website: [email or web address]

## 2018-08-30 NOTE — PROVIDER CONTACT NOTE (OTHER) - ASSESSMENT
MD Gaitan at bedside for eval, sono done and bladder is full. Order for straight cath if pt cannot void on her own.
Fundus firm and midline, bleeding light. Most recent /67 with HR 87 at 2100 on 8/30/18
Fundus firm and midline, bleeding light. Most recent /67 with HR 87 at 2100 on 8/30/18.
Patient asymptomatic, firm and at with light bleeding and VS WNL.
V/S stable, fundus firm, and vaginal bleeding WNL. Patient denies feeling lightheaded and is otherwise asymptomatic. HR is 89 bpm and BP is 103/65

## 2018-08-30 NOTE — DISCHARGE NOTE OB - CARE PLAN
Principal Discharge DX:	Vaginal delivery  Goal:	recovery  Assessment and plan of treatment:	Follow up in office in 6 weeks for postpartum visit.

## 2018-08-30 NOTE — PROVIDER CONTACT NOTE (OTHER) - SITUATION
H+H drawn 8/29 at 0600 resulted with H+H of 6.5 and 22.2. Reported to this RN at 0255 on 8/30/18 by Mirta Sky in the lab.

## 2018-08-30 NOTE — DISCHARGE NOTE OB - MEDICATION SUMMARY - MEDICATIONS TO TAKE
I will START or STAY ON the medications listed below when I get home from the hospital:    acetaminophen 325 mg oral tablet  -- 3 tab(s) by mouth every 6 hours  -- Indication: For pain    ibuprofen 600 mg oral tablet  -- 1 tab(s) by mouth every 6 hours  -- Indication: For pain    ferrous sulfate 325 mg (65 mg elemental iron) oral tablet  -- 1 tab(s) by mouth 3 times a day  -- Indication: For anemia    docusate sodium 100 mg oral capsule  -- 1 cap(s) by mouth 3 times a day  -- Indication: For constipation    levothyroxine 100 mcg (0.1 mg) oral tablet  -- 1 tab(s) by mouth once a day  -- Indication: For hypothyroid    ascorbic acid 500 mg oral tablet  -- 1 tab(s) by mouth once a day  -- Indication: For anemia

## 2018-08-30 NOTE — PROVIDER CONTACT NOTE (OTHER) - SITUATION
Day 2  with critical H+H of 6.6 and 20.5 collected at 0602 on , reported at 0620 by Austin eBrmeo in the lab.

## 2018-08-30 NOTE — PROGRESS NOTE ADULT - ATTENDING COMMENTS
Patient seen and evaluated  Agree with above note  Continue to monitor, repeat H/H at 12pm as well as vital signs. Plan to proceed with transfusion if becomes symptomatic.  MD Nhan

## 2018-08-30 NOTE — PROVIDER CONTACT NOTE (OTHER) - BACKGROUND
Day 2 , passed a clot in the afternoon per AM RN and was asymptomatic following passing of clot with normal VS.

## 2018-08-30 NOTE — DISCHARGE NOTE OB - CARE PROVIDER_API CALL
Ana Justin (MD), Obstetrics  Gynecology  [address] [place] [id number]  Phone: [phone]  Fax: [phone]

## 2018-08-30 NOTE — DISCHARGE NOTE OB - HOSPITAL COURSE
Patient s/p . Postpartum course complicated by asymptomatic anemia, improved by PPD#2, started on PO iron. Stable for discharge home on PPD#2.

## 2018-08-30 NOTE — PROGRESS NOTE ADULT - PROBLEM SELECTOR PLAN 1
Increase OOB  Regular diet  PO Pain protocol  AM H&H  Routine Postpartum Care
- Pain well controlled, continue current pain regimen  - Increase ambulation  - Continue regular diet  - Discharge planning

## 2018-08-30 NOTE — PROVIDER CONTACT NOTE (OTHER) - SITUATION
H+H drawn 8/29 at 0600 resulted with H+H of 6.5 and 22.2. Reported to this RN at 0255 on 8/30/18 by Mirta Rogel in the lab.

## 2018-08-30 NOTE — PROVIDER CONTACT NOTE (OTHER) - BACKGROUND
Day 2 , passed a clot in the afternoon per AM RN was asymptomatic following passing the clot with normal VS.

## 2018-08-30 NOTE — PROGRESS NOTE ADULT - SUBJECTIVE AND OBJECTIVE BOX
Postpartum Note- PPD#1    Prenatal Labs  Blood type: A Negative  Rubella IgG: Imm  RPR: Negative        S:Patient w/o complaints, pain is controlled.    Pt is OOB, tolerating PO, voiding. Lochia heavy on initial exam, mod after bladder emptied     O:  Vital Signs Last 24 Hrs  T(C): 36.4 (29 Aug 2018 05:00), Max: 37.1 (28 Aug 2018 13:00)  T(F): 97.5 (29 Aug 2018 05:00), Max: 98.7 (28 Aug 2018 13:00)  HR: 88 (29 Aug 2018 05:00) (88 - 101)  BP: 115/76 (29 Aug 2018 05:00) (96/69 - 115/76)  BP(mean): --  RR: 18 (29 Aug 2018 05:00) (18 - 18)  SpO2: --     Gen: NAD  Abdomen: Soft, nontender, non-distended, fundus firm after emptying bladder  Vaginal: Lochia WNL,    Ext:  Neg calf tenderness    LABS:    Hemoglobin: 10.4 g/dL (08-27 @ 18:25)      Hematocrit: 32.3 % (08-27 @ 18:25)
OB Postpartum Note - Vaginal Delivery  Patient is 23y  s/p  PP day 2    Subjective:  Patient seen and examined at bedside. No acute overnight events. No acute complaints, pain well controlled. Patient is ambulating, voiding spontaneously, passing gas, and tolerating regular diet. Patient denies SOB, lightheaded ness, dizziness, CP, N/V, leg pain.     Objective:  Vital Signs Last 24 Hours  T(C): 36.9 (18 @ 05:00), Max: 36.9 (18 @ 05:00)  HR: 85 (18 @ 05:00) (85 - 91)  BP: 113/72 (18 @ 05:00) (108/67 - 119/74)  RR: 18 (18 @ 05:00) (18 - 18)      Physical Exam:  General: NAD  Pulm: Clear to auscultation bilaterally  Cardio: Normal rate and regular rhythm  Abdomen: Soft, non-tender, non-distended, firm uterine fundus   Pelvic: Lochia wnl  Ext: No edema, No erythema, Non-tender    Labs:  Blood Type: A Negative  Antibody Screen: Negative  RPR: Negative                            6.6    9.7   )-----------( 303      ( 30 Aug 2018 06:02 )             20.5         MEDICATIONS  (STANDING):  acetaminophen   Tablet. 975 milliGRAM(s) Oral every 6 hours  diphtheria/tetanus/pertussis (acellular) Vaccine (ADAcel) 0.5 milliLiter(s) IntraMuscular once  ibuprofen  Tablet 600 milliGRAM(s) Oral every 6 hours  ketoconazole 2% Cream 1 Application(s) Topical two times a day  levothyroxine 100 MICROGram(s) Oral daily  oxyCODONE    IR 5 milliGRAM(s) Oral every 3 hours  oxytocin Infusion 4 milliUNIT(s)/Min (4 mL/Hr) IV Continuous <Continuous>  oxytocin Infusion 41.667 milliUNIT(s)/Min (125 mL/Hr) IV Continuous <Continuous>  prenatal multivitamin 1 Tablet(s) Oral daily  sodium chloride 0.9% lock flush 3 milliLiter(s) IV Push every 8 hours    MEDICATIONS  (PRN):  dibucaine 1% Ointment 1 Application(s) Topical every 4 hours PRN Perineal Discomfort  diphenhydrAMINE   Capsule 25 milliGRAM(s) Oral every 6 hours PRN Itching  docusate sodium 100 milliGRAM(s) Oral two times a day PRN Stool Softening  glycerin Suppository - Adult 1 Suppository(s) Rectal at bedtime PRN Constipation  hydrocortisone 1% Cream 1 Application(s) Topical every 4 hours PRN Moderate to Severe Perineal Pain  lanolin Ointment 1 Application(s) Topical every 6 hours PRN Sore Nipples  magnesium hydroxide Suspension 30 milliLiter(s) Oral two times a day PRN Constipation  oxyCODONE    IR 5 milliGRAM(s) Oral every 4 hours PRN Severe Pain (7 -10)  pramoxine 1%/zinc 5% Cream 1 Application(s) Topical every 4 hours PRN Moderate to Severe Perineal Pain  simethicone 80 milliGRAM(s) Chew every 6 hours PRN Gas  witch hazel Pads 1 Application(s) Topical every 4 hours PRN Perineal Discomfort      Neyda Sequeira, PGY-1
Pt feels well.  Ambulating.  Tolerating regular PO.    F/u CBC.  Routine PP care.  Stable.  MSP

## 2018-08-30 NOTE — PROVIDER CONTACT NOTE (OTHER) - ACTION/TREATMENT ORDERED:
Dr. Justin notified and says patient can be discharged to home now. Patent prescribed iron to take at home. Patient aware.

## 2018-09-24 ENCOUNTER — APPOINTMENT (OUTPATIENT)
Dept: DERMATOLOGY | Facility: CLINIC | Age: 23
End: 2018-09-24

## 2018-10-02 ENCOUNTER — APPOINTMENT (OUTPATIENT)
Dept: ENDOCRINOLOGY | Facility: CLINIC | Age: 23
End: 2018-10-02

## 2018-10-16 ENCOUNTER — RX RENEWAL (OUTPATIENT)
Age: 23
End: 2018-10-16

## 2019-02-21 ENCOUNTER — APPOINTMENT (OUTPATIENT)
Dept: ENDOCRINOLOGY | Facility: CLINIC | Age: 24
End: 2019-02-21
Payer: COMMERCIAL

## 2019-02-21 VITALS
DIASTOLIC BLOOD PRESSURE: 70 MMHG | WEIGHT: 156 LBS | OXYGEN SATURATION: 98 % | HEART RATE: 91 BPM | BODY MASS INDEX: 26.63 KG/M2 | SYSTOLIC BLOOD PRESSURE: 118 MMHG | HEIGHT: 64 IN

## 2019-02-21 DIAGNOSIS — E03.9 HYPOTHYROIDISM, UNSPECIFIED: ICD-10-CM

## 2019-02-21 PROCEDURE — 99214 OFFICE O/P EST MOD 30 MIN: CPT

## 2019-02-21 NOTE — ASSESSMENT
[FreeTextEntry1] : Patient is a 22 yo woman with hypothyroidism post partum, delivery in August 2018.  Recently started on OCP for contraception\par \par 1. Hypothyroid\par -the patient has mild symptoms of hyperthyroidism including heat intolerance, palpitations.  She was started on OCP and is post-partum. She decreased dose of levothyroxine to 100 mcg 8 pills from 9 pills on her own, missed follow up visit.  For now, repeat TFT's.  Anticipate decreasing doses\par -otherwise had a healthy pregnancy, no complications\par \par Follow up in 4-5 months, sooner as needed [Levothyroxine] : The patient was instructed to take Levothyroxine on an empty stomach, separate from vitamins, and wait at least 30 minutes before eating

## 2019-02-21 NOTE — PHYSICAL EXAM
[Alert] : alert [No Acute Distress] : no acute distress [Well Nourished] : well nourished [Well Developed] : well developed [No Proptosis] : no proptosis [Normal Hearing] : hearing was normal [Thyroid Not Enlarged] : the thyroid was not enlarged [No Respiratory Distress] : no respiratory distress [Normal Rate and Effort] : normal respiratory rhythm and effort [No Accessory Muscle Use] : no accessory muscle use [Clear to Auscultation] : lungs were clear to auscultation bilaterally [Normal Rate] : heart rate was normal  [Normal S1, S2] : normal S1 and S2 [Regular Rhythm] : with a regular rhythm [Normal Bowel Sounds] : normal bowel sounds [Not Tender] : non-tender [Soft] : abdomen soft [Normal Gait] : normal gait [No Joint Swelling] : no joint swelling seen [Normal Strength/Tone] : muscle strength and tone were normal [No Motor Deficits] : the motor exam was normal [Normal Insight/Judgement] : insight and judgment were intact [Normal Affect] : the affect was normal [Normal Mood] : the mood was normal [de-identified] : mild tremors

## 2019-02-21 NOTE — REVIEW OF SYSTEMS
[Fatigue] : no fatigue [Decreased Appetite] : appetite not decreased [Dysphagia] : no dysphagia [Dysphonia] : no dysphonia [Chest Pain] : no chest pain [Palpitations] : palpitations [Heart Rate Is Slow] : the heart rate was not slow [Heart Rate Is Fast] : the heart rate was not fast [Nausea] : no nausea [Vomiting] : no vomiting was observed [Constipation] : no constipation [Diarrhea] : no diarrhea [Irregular Menses] : regular menses [Joint Pain] : no joint pain [Joint Stiffness] : no joint stiffness [Muscle Weakness] : no muscle weakness [Muscle Cramps] : no muscle cramps [Headache] : no headaches [Tremors] : no tremors [Depression] : no depression [Anxiety] : no anxiety [Negative] : Eyes [All other systems negative] : All other systems negative

## 2019-02-21 NOTE — HISTORY OF PRESENT ILLNESS
[FreeTextEntry1] : 24 yo woman with no significant medical history presents for evaluation of hypothyroidism.\par \par Patient was diagnosed with hypothyroidism at 17 years old, was on levothyroxine for many years and then changed to levoxyl 112 mcg 1 year ago. Patient "feels fine." She was seen by an ENT and was told she could have acid reflux. Patient was being followed by Dr. Farmer but insurance changed. She is currently 21 weeks pregnant and a TSH was done by her GYN. TSH was 4.08 so she was her levothyroxine dose was increase by 20%. She takes levoxyl 100 mcg daily, extra pill on Saturday/Sunday. Patient is currently without problems. Estimated delivery date is August 28, 2018. No complications.  Now on levoxyl 100 mcg 8 pills a week.  Gets tired easily but possibly due to baby care. Not currently breast feeding.  Always constipated. Regular periods back on OCP.\par Mother: hypothyroid

## 2019-02-25 LAB
T3 SERPL-MCNC: 156 NG/DL
T4 FREE SERPL-MCNC: 1.6 NG/DL
THYROGLOB AB SERPL-ACNC: <20 IU/ML
THYROPEROXIDASE AB SERPL IA-ACNC: 12.8 IU/ML
TSH SERPL-ACNC: 1.13 UIU/ML

## 2019-03-08 ENCOUNTER — APPOINTMENT (OUTPATIENT)
Dept: DERMATOLOGY | Facility: CLINIC | Age: 24
End: 2019-03-08

## 2019-03-11 ENCOUNTER — APPOINTMENT (OUTPATIENT)
Dept: ULTRASOUND IMAGING | Facility: CLINIC | Age: 24
End: 2019-03-11

## 2019-03-11 ENCOUNTER — OUTPATIENT (OUTPATIENT)
Dept: OUTPATIENT SERVICES | Facility: HOSPITAL | Age: 24
LOS: 1 days | End: 2019-03-11
Payer: COMMERCIAL

## 2019-03-11 DIAGNOSIS — Z00.8 ENCOUNTER FOR OTHER GENERAL EXAMINATION: ICD-10-CM

## 2019-03-11 PROCEDURE — 76856 US EXAM PELVIC COMPLETE: CPT

## 2019-03-11 PROCEDURE — 76856 US EXAM PELVIC COMPLETE: CPT | Mod: 26

## 2019-04-10 ENCOUNTER — APPOINTMENT (OUTPATIENT)
Dept: OTOLARYNGOLOGY | Facility: CLINIC | Age: 24
End: 2019-04-10
Payer: COMMERCIAL

## 2019-04-10 VITALS
WEIGHT: 156 LBS | SYSTOLIC BLOOD PRESSURE: 125 MMHG | BODY MASS INDEX: 26.63 KG/M2 | HEART RATE: 88 BPM | DIASTOLIC BLOOD PRESSURE: 73 MMHG | HEIGHT: 64 IN

## 2019-04-10 DIAGNOSIS — H61.23 IMPACTED CERUMEN, BILATERAL: ICD-10-CM

## 2019-04-10 DIAGNOSIS — H93.13 TINNITUS, BILATERAL: ICD-10-CM

## 2019-04-10 DIAGNOSIS — R07.0 PAIN IN THROAT: ICD-10-CM

## 2019-04-10 DIAGNOSIS — R13.12 DYSPHAGIA, OROPHARYNGEAL PHASE: ICD-10-CM

## 2019-04-10 PROCEDURE — 99204 OFFICE O/P NEW MOD 45 MIN: CPT | Mod: 25

## 2019-04-10 PROCEDURE — 69210 REMOVE IMPACTED EAR WAX UNI: CPT

## 2019-04-10 PROCEDURE — 31575 DIAGNOSTIC LARYNGOSCOPY: CPT

## 2019-04-10 RX ORDER — AMOXICILLIN 400 MG/5ML
400 FOR SUSPENSION ORAL
Qty: 100 | Refills: 0 | Status: COMPLETED | COMMUNITY
Start: 2019-03-11

## 2019-04-10 RX ORDER — AMOXICILLIN 250 MG/5ML
250 POWDER, FOR SUSPENSION ORAL
Qty: 150 | Refills: 0 | Status: COMPLETED | COMMUNITY
Start: 2019-03-23

## 2019-04-10 NOTE — CONSULT LETTER
[Dear  ___] : Dear  [unfilled], [Courtesy Letter:] : I had the pleasure of seeing your patient, [unfilled], in my office today. [Please see my note below.] : Please see my note below. [Sincerely,] : Sincerely, [Consult Closing:] : Thank you very much for allowing me to participate in the care of this patient.  If you have any questions, please do not hesitate to contact me. [FreeTextEntry3] : Paula Andrade MD\par Otolaryngology and Cranial Base Surgery\par Attending Physician - Department of Otolaryngology and Head & Neck Surgery\par [facility]\par  - [employer] [facility]\par Office: [phone]\par Fax: [phone]\par

## 2019-04-10 NOTE — ASSESSMENT
[FreeTextEntry1] : cerumen:\par - removed\par \par throat discomfort:\par - normal laryngoscopy, suspect reflux\par - given reflux precautions handout\par \par f/u PRN

## 2019-04-10 NOTE — PHYSICAL EXAM
[Midline] : trachea located in midline position [Normal] : no rashes [de-identified] : b/l cerumen impaction

## 2019-04-10 NOTE — PROCEDURE
[FreeTextEntry3] : Procedure - Cerumen Removal. \par After informed verbal consent is obtained, cerumen is removed from the b/l ear canal with a curette and suction.  Normal appearing canal following removal.\par  [de-identified] : No topical used. Flexible scope #2 used. Passed through nasal passage and nasopharynx/oropharynx/hypopharynx clear. Supraglottis normal. Glottis with fully mobile vocal cords without lesions or masses. Visualized subglottis clear. Postcricoid area without erythema or edema. No pooling of secretions.

## 2019-04-10 NOTE — HISTORY OF PRESENT ILLNESS
[de-identified] : 24 y/o F with about 6 months of b/l ear discomfort and tinnitus.  No change in hearing, no d/c, no Vertigo. \par Also notes 3 days of sore throat, no change in voice.  Notes ate a banana yesterday and feels it got a little stuck.  No SOB.  Sometimes has sensation of something in throat. Happened about a year ago when a pill got stuck but then resolved. No smoking. No unintentional weight loss.

## 2019-04-10 NOTE — REASON FOR VISIT
[Initial Consultation] : an initial consultation for [FreeTextEntry2] : b/l ear discomfort and tinnitus.

## 2019-06-03 ENCOUNTER — RX RENEWAL (OUTPATIENT)
Age: 24
End: 2019-06-03

## 2019-08-07 ENCOUNTER — APPOINTMENT (OUTPATIENT)
Dept: ANTEPARTUM | Facility: CLINIC | Age: 24
End: 2019-08-07
Payer: COMMERCIAL

## 2019-08-07 ENCOUNTER — ASOB RESULT (OUTPATIENT)
Age: 24
End: 2019-08-07

## 2019-08-07 PROCEDURE — 76811 OB US DETAILED SNGL FETUS: CPT

## 2019-08-07 PROCEDURE — 76817 TRANSVAGINAL US OBSTETRIC: CPT

## 2019-08-21 ENCOUNTER — APPOINTMENT (OUTPATIENT)
Dept: ENDOCRINOLOGY | Facility: CLINIC | Age: 24
End: 2019-08-21

## 2019-09-24 ENCOUNTER — APPOINTMENT (OUTPATIENT)
Dept: ENDOCRINOLOGY | Facility: CLINIC | Age: 24
End: 2019-09-24

## 2019-11-12 ENCOUNTER — TRANSCRIPTION ENCOUNTER (OUTPATIENT)
Age: 24
End: 2019-11-12

## 2019-12-11 ENCOUNTER — TRANSCRIPTION ENCOUNTER (OUTPATIENT)
Age: 24
End: 2019-12-11

## 2019-12-24 ENCOUNTER — INPATIENT (INPATIENT)
Facility: HOSPITAL | Age: 24
LOS: 1 days | Discharge: HOME | End: 2019-12-26
Attending: OBSTETRICS & GYNECOLOGY | Admitting: OBSTETRICS & GYNECOLOGY
Payer: MEDICAID

## 2019-12-24 VITALS — HEIGHT: 64 IN | WEIGHT: 198.42 LBS

## 2019-12-24 DIAGNOSIS — O26.899 OTHER SPECIFIED PREGNANCY RELATED CONDITIONS, UNSPECIFIED TRIMESTER: ICD-10-CM

## 2019-12-24 DIAGNOSIS — Z3A.00 WEEKS OF GESTATION OF PREGNANCY NOT SPECIFIED: ICD-10-CM

## 2019-12-24 DIAGNOSIS — Z34.80 ENCOUNTER FOR SUPERVISION OF OTHER NORMAL PREGNANCY, UNSPECIFIED TRIMESTER: ICD-10-CM

## 2019-12-24 LAB
BASOPHILS # BLD AUTO: 0.05 K/UL — SIGNIFICANT CHANGE UP (ref 0–0.2)
BASOPHILS NFR BLD AUTO: 0.4 % — SIGNIFICANT CHANGE UP (ref 0–2)
BLD GP AB SCN SERPL QL: NEGATIVE — SIGNIFICANT CHANGE UP
EOSINOPHIL # BLD AUTO: 0.08 K/UL — SIGNIFICANT CHANGE UP (ref 0–0.5)
EOSINOPHIL NFR BLD AUTO: 0.7 % — SIGNIFICANT CHANGE UP (ref 0–6)
HCT VFR BLD CALC: 38.1 % — SIGNIFICANT CHANGE UP (ref 34.5–45)
HGB BLD-MCNC: 12.2 G/DL — SIGNIFICANT CHANGE UP (ref 11.5–15.5)
IMM GRANULOCYTES NFR BLD AUTO: 0.5 % — SIGNIFICANT CHANGE UP (ref 0–1.5)
LYMPHOCYTES # BLD AUTO: 2.49 K/UL — SIGNIFICANT CHANGE UP (ref 1–3.3)
LYMPHOCYTES # BLD AUTO: 22.4 % — SIGNIFICANT CHANGE UP (ref 13–44)
MCHC RBC-ENTMCNC: 29.8 PG — SIGNIFICANT CHANGE UP (ref 27–34)
MCHC RBC-ENTMCNC: 32 GM/DL — SIGNIFICANT CHANGE UP (ref 32–36)
MCV RBC AUTO: 93.2 FL — SIGNIFICANT CHANGE UP (ref 80–100)
MONOCYTES # BLD AUTO: 0.84 K/UL — SIGNIFICANT CHANGE UP (ref 0–0.9)
MONOCYTES NFR BLD AUTO: 7.5 % — SIGNIFICANT CHANGE UP (ref 2–14)
NEUTROPHILS # BLD AUTO: 7.61 K/UL — HIGH (ref 1.8–7.4)
NEUTROPHILS NFR BLD AUTO: 68.5 % — SIGNIFICANT CHANGE UP (ref 43–77)
NRBC # BLD: 0 /100 WBCS — SIGNIFICANT CHANGE UP (ref 0–0)
PLATELET # BLD AUTO: 249 K/UL — SIGNIFICANT CHANGE UP (ref 150–400)
RBC # BLD: 4.09 M/UL — SIGNIFICANT CHANGE UP (ref 3.8–5.2)
RBC # FLD: 16.2 % — HIGH (ref 10.3–14.5)
RH IG SCN BLD-IMP: NEGATIVE — SIGNIFICANT CHANGE UP
RH IG SCN BLD-IMP: NEGATIVE — SIGNIFICANT CHANGE UP
T PALLIDUM AB TITR SER: NEGATIVE — SIGNIFICANT CHANGE UP
WBC # BLD: 11.13 K/UL — HIGH (ref 3.8–10.5)
WBC # FLD AUTO: 11.13 K/UL — HIGH (ref 3.8–10.5)

## 2019-12-24 RX ORDER — SIMETHICONE 80 MG/1
80 TABLET, CHEWABLE ORAL EVERY 4 HOURS
Refills: 0 | Status: DISCONTINUED | OUTPATIENT
Start: 2019-12-24 | End: 2019-12-26

## 2019-12-24 RX ORDER — KETOROLAC TROMETHAMINE 30 MG/ML
30 SYRINGE (ML) INJECTION ONCE
Refills: 0 | Status: DISCONTINUED | OUTPATIENT
Start: 2019-12-24 | End: 2019-12-26

## 2019-12-24 RX ORDER — SODIUM CHLORIDE 9 MG/ML
3 INJECTION INTRAMUSCULAR; INTRAVENOUS; SUBCUTANEOUS EVERY 8 HOURS
Refills: 0 | Status: DISCONTINUED | OUTPATIENT
Start: 2019-12-24 | End: 2019-12-26

## 2019-12-24 RX ORDER — SODIUM CHLORIDE 9 MG/ML
1000 INJECTION, SOLUTION INTRAVENOUS
Refills: 0 | Status: DISCONTINUED | OUTPATIENT
Start: 2019-12-24 | End: 2019-12-24

## 2019-12-24 RX ORDER — ACETAMINOPHEN 500 MG
1000 TABLET ORAL ONCE
Refills: 0 | Status: COMPLETED | OUTPATIENT
Start: 2019-12-24 | End: 2019-12-24

## 2019-12-24 RX ORDER — OXYCODONE HYDROCHLORIDE 5 MG/1
5 TABLET ORAL ONCE
Refills: 0 | Status: DISCONTINUED | OUTPATIENT
Start: 2019-12-24 | End: 2019-12-26

## 2019-12-24 RX ORDER — OXYTOCIN 10 UNIT/ML
333.33 VIAL (ML) INJECTION
Qty: 20 | Refills: 0 | Status: DISCONTINUED | OUTPATIENT
Start: 2019-12-24 | End: 2019-12-26

## 2019-12-24 RX ORDER — TETANUS TOXOID, REDUCED DIPHTHERIA TOXOID AND ACELLULAR PERTUSSIS VACCINE, ADSORBED 5; 2.5; 8; 8; 2.5 [IU]/.5ML; [IU]/.5ML; UG/.5ML; UG/.5ML; UG/.5ML
0.5 SUSPENSION INTRAMUSCULAR ONCE
Refills: 0 | Status: DISCONTINUED | OUTPATIENT
Start: 2019-12-24 | End: 2019-12-26

## 2019-12-24 RX ORDER — OXYTOCIN 10 UNIT/ML
333.33 VIAL (ML) INJECTION
Qty: 20 | Refills: 0 | Status: DISCONTINUED | OUTPATIENT
Start: 2019-12-24 | End: 2019-12-24

## 2019-12-24 RX ORDER — CITRIC ACID/SODIUM CITRATE 300-500 MG
15 SOLUTION, ORAL ORAL EVERY 6 HOURS
Refills: 0 | Status: DISCONTINUED | OUTPATIENT
Start: 2019-12-24 | End: 2019-12-24

## 2019-12-24 RX ORDER — HYDROCORTISONE 1 %
1 OINTMENT (GRAM) TOPICAL EVERY 6 HOURS
Refills: 0 | Status: DISCONTINUED | OUTPATIENT
Start: 2019-12-24 | End: 2019-12-26

## 2019-12-24 RX ORDER — AMPICILLIN TRIHYDRATE 250 MG
1 CAPSULE ORAL EVERY 4 HOURS
Refills: 0 | Status: DISCONTINUED | OUTPATIENT
Start: 2019-12-24 | End: 2019-12-24

## 2019-12-24 RX ORDER — PRAMOXINE HYDROCHLORIDE 150 MG/15G
1 AEROSOL, FOAM RECTAL EVERY 4 HOURS
Refills: 0 | Status: DISCONTINUED | OUTPATIENT
Start: 2019-12-24 | End: 2019-12-26

## 2019-12-24 RX ORDER — DIBUCAINE 1 %
1 OINTMENT (GRAM) RECTAL EVERY 6 HOURS
Refills: 0 | Status: DISCONTINUED | OUTPATIENT
Start: 2019-12-24 | End: 2019-12-26

## 2019-12-24 RX ORDER — GLYCERIN ADULT
1 SUPPOSITORY, RECTAL RECTAL AT BEDTIME
Refills: 0 | Status: DISCONTINUED | OUTPATIENT
Start: 2019-12-24 | End: 2019-12-26

## 2019-12-24 RX ORDER — AMPICILLIN TRIHYDRATE 250 MG
2 CAPSULE ORAL ONCE
Refills: 0 | Status: COMPLETED | OUTPATIENT
Start: 2019-12-24 | End: 2019-12-24

## 2019-12-24 RX ORDER — IBUPROFEN 200 MG
600 TABLET ORAL EVERY 6 HOURS
Refills: 0 | Status: DISCONTINUED | OUTPATIENT
Start: 2019-12-24 | End: 2019-12-26

## 2019-12-24 RX ORDER — ACETAMINOPHEN 500 MG
975 TABLET ORAL
Refills: 0 | Status: DISCONTINUED | OUTPATIENT
Start: 2019-12-24 | End: 2019-12-26

## 2019-12-24 RX ORDER — LANOLIN
1 OINTMENT (GRAM) TOPICAL EVERY 6 HOURS
Refills: 0 | Status: DISCONTINUED | OUTPATIENT
Start: 2019-12-24 | End: 2019-12-26

## 2019-12-24 RX ORDER — OXYCODONE HYDROCHLORIDE 5 MG/1
5 TABLET ORAL
Refills: 0 | Status: DISCONTINUED | OUTPATIENT
Start: 2019-12-24 | End: 2019-12-26

## 2019-12-24 RX ORDER — ACETAMINOPHEN 500 MG
975 TABLET ORAL EVERY 6 HOURS
Refills: 0 | Status: DISCONTINUED | OUTPATIENT
Start: 2019-12-24 | End: 2019-12-26

## 2019-12-24 RX ORDER — AER TRAVELER 0.5 G/1
1 SOLUTION RECTAL; TOPICAL EVERY 4 HOURS
Refills: 0 | Status: DISCONTINUED | OUTPATIENT
Start: 2019-12-24 | End: 2019-12-26

## 2019-12-24 RX ORDER — MAGNESIUM HYDROXIDE 400 MG/1
30 TABLET, CHEWABLE ORAL
Refills: 0 | Status: DISCONTINUED | OUTPATIENT
Start: 2019-12-24 | End: 2019-12-26

## 2019-12-24 RX ORDER — OXYTOCIN 10 UNIT/ML
4 VIAL (ML) INJECTION
Qty: 30 | Refills: 0 | Status: DISCONTINUED | OUTPATIENT
Start: 2019-12-24 | End: 2019-12-24

## 2019-12-24 RX ORDER — DIPHENHYDRAMINE HCL 50 MG
25 CAPSULE ORAL EVERY 6 HOURS
Refills: 0 | Status: DISCONTINUED | OUTPATIENT
Start: 2019-12-24 | End: 2019-12-26

## 2019-12-24 RX ORDER — BENZOCAINE 10 %
1 GEL (GRAM) MUCOUS MEMBRANE EVERY 6 HOURS
Refills: 0 | Status: DISCONTINUED | OUTPATIENT
Start: 2019-12-24 | End: 2019-12-26

## 2019-12-24 RX ADMIN — Medication 4 MILLIUNIT(S)/MIN: at 05:50

## 2019-12-24 RX ADMIN — Medication 975 MILLIGRAM(S): at 20:31

## 2019-12-24 RX ADMIN — Medication 400 MILLIGRAM(S): at 12:00

## 2019-12-24 RX ADMIN — Medication 600 MILLIGRAM(S): at 17:30

## 2019-12-24 RX ADMIN — SODIUM CHLORIDE 125 MILLILITER(S): 9 INJECTION, SOLUTION INTRAVENOUS at 04:40

## 2019-12-24 RX ADMIN — Medication 0.2 MILLIGRAM(S): at 11:59

## 2019-12-24 RX ADMIN — Medication 0.2 MILLIGRAM(S): at 16:14

## 2019-12-24 RX ADMIN — Medication 0.2 MILLIGRAM(S): at 20:28

## 2019-12-24 RX ADMIN — Medication 975 MILLIGRAM(S): at 21:30

## 2019-12-24 RX ADMIN — Medication 216 GRAM(S): at 04:39

## 2019-12-24 RX ADMIN — Medication 600 MILLIGRAM(S): at 18:15

## 2019-12-24 NOTE — CHART NOTE - NSCHARTNOTEFT_GEN_A_CORE
OBGYN Advanced Practitioner Note:    Pt seen at bedside for moderate vaginal bleeding.   Pt  denies  dizziness,  loss of consciousness,  CP, palpitations, SOB, dyspnea. Pt passed few small clots.  Pt had uncomplicated  w/ EBL 350cc.     PE:  T(C): 36.7 (19 @ 08:45), Max: 36.7 (19 @ 08:45)  HR: 91 (19 @ 09:45) (78 - 97)  BP: 106/56 (19 @ 09:45) (95/55 - 115/56)  RR: 17 (19 @ 09:45) (17 - 18)  SpO2: 97% (19 @ 09:45) (97% - 97%)    gen: NAD A+Ox3  Card: S1S2 Clear. RRR.  Pulm: CTA b/l   Abd: +BS. Soft, nondistended. Appropriately tender. Fundus firm.   : Pt sitting on ~50cc of blood on pad. Repeated fundal massage produced ~50cc more of blood.                            12.2   11.13 )-----------( 249      ( 24 Dec 2019 06:13 )             38.1     12.2      Plan: Pt PPD#0    s/p  w/ EBL 350cc from delivery w/ heavy vaginal bleeding in recovery (additional 100cc).   -Cytotec 1000mcg given OK.   -Pt stable VS and no symptoms of anemia. Will continue to monitor for bleeding.  d/w Dr. Valencia.  OMAYRA Gonzales

## 2019-12-24 NOTE — PATIENT PROFILE OB - ABILITY TO HEAR (WITH HEARING AID OR HEARING APPLIANCE IF NORMALLY USED):
H & P


Stated Complaint: pregnant w/dizzy, N/V, abd pain, back pain


Time Seen by Provider: 18 21:52


HPI/ROS: 


HPI:  This is a 31-year-old female who presents with





Chief Complaint: pregnant w/dizzy, N/V, abd pain, back pain





Location:  GI


Quality:  Nausea, vomiting


Duration:  Starting this morning


Signs and Symptoms: no fever, + nausea, + vomiting x5 times, no hematemesis, no 

blood in stool, no abdominal bloating, + diarrhea x5 times, + back pain, no 

urinary symptoms, no vaginal bleeding/discharge, no indigestion, no chest pain, 

no shortness of breath


Timing:  Acute, intermittent episodes


Severity:  Moderate


Context:  Patient is currently 19 weeks pregnant presents with waking up this 

morning with nausea and vomiting 5 times stomach contents as well as loose 

stools x5 times.  Patient reports that her morning sickness resolved 

approximately 1 month ago.  She denies fever, urinary symptoms.  No recent 

antibiotic use.  No concerns for food borne illness.  Patient reports that she 

has only drank 1 cup of water and ate Ravioli today.  Patient is followed by 

[facility].  She complains of lower back discomfort along with 

bilateral lower abdominal cramping.  Denies any vaginal bleeding/discharge.  

Patient is unsure blood type.  Appointment on Thursday with high risk group.


Modifying Factors:  None





Comment: 








ROS:  A comprehensive 10 system review of systems is otherwise negative aside 

from elements mentioned in the history of present illness. 





MEDICAL/SURGICAL/SOCIAL HISTORY: 


Medical/surgical history:  SINUS SURGERY, TONSILLECTOMY,  . 

Aspergers. PTSD, bipolar, Appendectomy


1 , 2 's, 


Social history:  Denies drug, alcohol, tobacco use.  Family history 

noncontributory.








CONSTITUTIONAL:  Patient remains with eyes closed when I am examining in 

talking to her about her history, lying flat on her back, awake and alert, no 

obvious distress


HEENT: Atraumatic and normocephalic, PERRL, EOMI.  Nares patent; no rhinorrhea;

  no nasal mucosal edema. Tympanic membranes clear. Oropharynx clear, no 

exudate and moist pink mucosa.  Airway patent.  No lymphadenopathy.  No 

meningismus.


Cardiovascular: Normal S1/S2, regular rate, regular rhythm, without murmur rub 

or gallop.


PULMONARY/CHEST:  Symmetrical and nontender. Clear to auscultation bilaterally. 

Good air movement. No accessory muscle usage.


ABDOMEN:  Soft, gravid, nontender, no rebound, no guarding, no peritoneal signs

, no masses or organomegaly. No CVAT.


EXTREMITIES:  2/2 pulses, strength 5/5, no deformities, no clubbing, no 

cyanosis or edema.


NEUROLOGICAL: no focal neuro deficits.  GCS 15.


SKIN: Warm and dry, no erythema. no rash.  Good capillary refill. 








Source: Patient


Exam Limitations: No limitations





- Personal History


Current Tetanus/Diphtheria Vaccine: Yes


Current Tetanus Diphtheria and Acellular Pertussis (TDAP): Yes


Tetanus Vaccine Date: within 10 years





- Medical/Surgical History


Hx Asthma: No


Hx Chronic Respiratory Disease: No


Hx Diabetes: No


Hx Cardiac Disease: No


Hx Renal Disease: No


Hx Cirrhosis: No


Hx Alcoholism: Yes


Hx HIV/AIDS: No


Hx Splenectomy or Spleen Trauma: No


Other PMH: SINUS SURGERY, TONSILLECTOMY,  . Aspergers. PTSD, 

bipolar.  Appendectomy.  1 , 2 's,





- Social History


Smoking Status: Former smoker


Constitutional: 


 Initial Vital Signs











Temperature (C)  37.3 C   18 21:32


 


Heart Rate  100   18 21:32


 


Respiratory Rate  20   18 21:32


 


Blood Pressure  102/62   18 21:32


 


O2 Sat (%)  95   18 21:32








 











O2 Delivery Mode               Room Air














Allergies/Adverse Reactions: 


 





gabapentin Allergy (Unknown, Unverified 18 21:34)


 Pt reports hallucination


haloperidol Allergy (Unknown, Unverified 18 21:34)


 Pt reports "body on fire"


lorazepam Allergy (Unknown, Unverified 18 21:34)


 Pt unsure of reaction


nabumetone Allergy (Unknown, Unverified 18 21:34)


 Pt unsure of reaction


adhesive [Adhesive] Allergy (Verified 18 21:34)


 Pt reports hives


aspirin [Aspirin] Allergy (Verified 18 21:34)


 Pt unsure of reaction


clindamycin [Clindamycin] Allergy (Verified 18 21:34)


 Pt reports seizure


cyclobenzaprine HCl [From Flexeril] Allergy (Verified 18 21:34)


 Pt reports inability to urinate


hydromorphone HCl [From Dilaudid] Allergy (Verified 18 21:34)


 Pt reports migraine


ipratropium bromide [From Atrovent] Allergy (Verified 18 21:34)


 Pt reports throat itching


ketorolac tromethamine [From Toradol] Allergy (Verified 18 21:34)


 Pt unsure of reaction


latex [Latex] Allergy (Verified 18 21:34)


 Pt reports itching


prednisone [Prednisone] Allergy (Verified 18 21:34)


 Pt reports rash with oral prednisone


vancomycin [Vancomycin] Allergy (Verified 18 21:34)


 Pt reports seizure


cyclobenzaprine HCl Allergy (Unknown, Uncoded 18 21:34)


 Pt reports inability to urinate


hydromorphone HCl Allergy (Unknown, Uncoded 18 21:34)


 Pt reports migraine


ipratropium bromide Allergy (Unknown, Uncoded 18 21:34)


 Pt reports throat itching


ketorolac tromethamine Allergy (Unknown, Uncoded 18 21:34)


 Pt unsure of reaction








Home Medications: 














 Medication  Instructions  Recorded


 


Herbals/Supplements -Info Only 1 ea PO DAILY 18


 


Lithium Carbonate [Lithium 300 mg PO DAILY 18





Carbonate Cap 300 mg (*)]  


 


Lithium Carbonate [Lithium 600 mg PO HS 18





Carbonate Cap 300 mg (*)]  


 


Promethazine HCl [Phenergan 25mg 25 mg PO Q6HRS PRN  tab 18





(*)]  


 


Pyridoxine HCl [Vitamin B-6 25 mg 25 mg PO TID PRN  tab 18





(*)]  


 


Promethazine HCl 25 mg PO Q6 PRN #12 tablet 18














Medical Decision Making





- Diagnostics


Imaging Results: 


 Imaging Impressions





Obstetrics Ultrasound  18 21:48


Impression: 


1. Living skinner pregnancy in vertex presentation.   Size concordant with 

dates.  The estimated gestational age by fetal biometry is 19 weeks and 4 days 

yielding an EDC of 2019. The estimated gestational age by previous 

dating is 19 weeks and 2 days.


2. Closed cervix. Cerclage intact.


3. Anterior placenta. No previa or evidence of abruption.


4. Borderline polyhydramnios.


5. Borderline bilateral pelvicaliectasis.


6. Proceed with scheduled perinatology office visit this Thursday.


 


Findings discussed with Emergency Department physician assistant, Alexa Braun  at  2018 23:45.


 


 











ED Course/Re-evaluation: 


Vital signs reviewed and shows mild tachycardia.  Afebrile.


IV access and laboratory studies along with urinalysis and ultrasound ordered


Given 2 L normal saline and IV promethazine 12.5 mg


2220:  Labs reviewed.  No signs of leukocytosis/anemia/platelet dysfunction/KATE/

electrolyte imbalance. 


Blood type is O-positive


2330:  Spoke with radiologist, Dr. So, who advised that the cervix looks 

good and close, no free fluid, placenta is anterior, no abruption, 5.3 fluid 

pocket, positive fetal heart tones 167 beats per minute with intrauterine 

pregnancy


0000: UA 1+ ketones; no signs of infection. 


2340:  Patient ambulating back and forth to bathroom without difficulties.  

Finally gave urine sample.  Patient just received a phone call from her child's 

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 


0005: reassessed patient after 2 L IV fluid.  Vital signs improved.  Given 

prescription for antiemetics.


Patient has an appointment with high risk OB group on Thursday. 











This patient was seen under the supervision of my secondary supervising 

physician.  I evaluated care for this patient independently.  Discussed this 

patient with Dr. Kaminski.  








Differential Diagnosis: 


Abdominal pain in a female including but not limited to ovarian cyst, pelvic 

inflammatory disease, ovarian torsion, urinary tract infection, and 

appendicitis.








- Data Points


Laboratory Results: 


 Laboratory Results





 18 21:50 





 18 21:50 





 











  18





  23:45 22:09 21:50


 


WBC      





    


 


RBC      





    


 


Hgb      





    


 


Hct      





    


 


MCV      





    


 


MCH      





    


 


MCHC      





    


 


RDW      





    


 


Plt Count      





    


 


MPV      





    


 


Neut % (Auto)      





    


 


Lymph % (Auto)      





    


 


Mono % (Auto)      





    


 


Eos % (Auto)      





    


 


Baso % (Auto)      





    


 


Nucleat RBC Rel Count      





    


 


Absolute Neuts (auto)      





    


 


Absolute Lymphs (auto)      





    


 


Absolute Monos (auto)      





    


 


Absolute Eos (auto)      





    


 


Absolute Basos (auto)      





    


 


Absolute Nucleated RBC      





    


 


Immature Gran %      





    


 


Immature Gran #      





    


 


RBC/WBC/PLT Morphology      





    


 


Platelet Estimate      





    


 


Sodium      134 mEq/L L mEq/L





     (135-145) 


 


Potassium      3.6 mEq/L mEq/L





     (3.3-5.0) 


 


Chloride      108 mEq/L mEq/L





     () 


 


Carbon Dioxide      19 mEq/l L mEq/l





     (22-31) 


 


Anion Gap      7 mEq/L mEq/L





     (6-14) 


 


BUN      12 mg/dL mg/dL





     (7-23) 


 


Creatinine      0.5 mg/dL L mg/dL





     (0.6-1.0) 


 


Estimated GFR      > 60 





    


 


Glucose      90 mg/dL mg/dL





     () 


 


Calcium      8.5 mg/dL mg/dL





     (8.5-10.4) 


 


Beta HCG, Quant      5093.30 mIU/mL H mIU/mL





     (0.00-4.83) 


 


Urine Color  YELLOW     





    


 


Urine Appearance  HAZY     





    


 


Urine pH  5.0     





   (5.0-7.5)   


 


Ur Specific Gravity  1.021     





   (1.002-1.030)   


 


Urine Protein  NEGATIVE     





   (NEGATIVE)   


 


Urine Ketones  1+  H     





   (NEGATIVE)   


 


Urine Blood  NEGATIVE     





   (NEGATIVE)   


 


Urine Nitrate  NEGATIVE     





   (NEGATIVE)   


 


Urine Bilirubin  NEGATIVE     





   (NEGATIVE)   


 


Urine Urobilinogen  NEGATIVE EU EU    





   (0.2-1.0)   


 


Ur Leukocyte Esterase  NEGATIVE     





   (NEGATIVE)   


 


Urine Glucose  NEGATIVE     





   (NEGATIVE)   


 


Patient ABO/Rh    O POSITIVE   





    














  18





  21:50


 


WBC  8.97 10^3/uL 10^3/uL





   (3.80-9.50) 


 


RBC  4.51 10^6/uL 10^6/uL





   (4.18-5.33) 


 


Hgb  13.3 g/dL g/dL





   (12.6-16.3) 


 


Hct  39.3 % %





   (38.0-47.0) 


 


MCV  87.1 fL fL





   (81.5-99.8) 


 


MCH  29.5 pg pg





   (27.9-34.1) 


 


MCHC  33.8 g/dL g/dL





   (32.4-36.7) 


 


RDW  13.5 % %





   (11.5-15.2) 


 


Plt Count  165 10^3/uL 10^3/uL





   (150-400) 


 


MPV  11.0 fL fL





   (8.7-11.7) 


 


Neut % (Auto)  89.2 % H %





   (39.3-74.2) 


 


Lymph % (Auto)  6.1 % L %





   (15.0-45.0) 


 


Mono % (Auto)  3.3 % L %





   (4.5-13.0) 


 


Eos % (Auto)  0.4 % L %





   (0.6-7.6) 


 


Baso % (Auto)  0.2 % L %





   (0.3-1.7) 


 


Nucleat RBC Rel Count  0.0 % %





   (0.0-0.2) 


 


Absolute Neuts (auto)  8.00 10^3/uL H 10^3/uL





   (1.70-6.50) 


 


Absolute Lymphs (auto)  0.55 10^3/uL L 10^3/uL





   (1.00-3.00) 


 


Absolute Monos (auto)  0.30 10^3/uL 10^3/uL





   (0.30-0.80) 


 


Absolute Eos (auto)  0.04 10^3/uL 10^3/uL





   (0.03-0.40) 


 


Absolute Basos (auto)  0.02 10^3/uL 10^3/uL





   (0.02-0.10) 


 


Absolute Nucleated RBC  0.00 10^3/uL 10^3/uL





   (0-0.01) 


 


Immature Gran %  0.8 % %





   (0.0-1.1) 


 


Immature Gran #  0.07 10^3/uL 10^3/uL





   (0.00-0.10) 


 


RBC/WBC/PLT Morphology  TNP 





  


 


Platelet Estimate  TNP 





  


 


Sodium  





  


 


Potassium  





  


 


Chloride  





  


 


Carbon Dioxide  





  


 


Anion Gap  





  


 


BUN  





  


 


Creatinine  





  


 


Estimated GFR  





  


 


Glucose  





  


 


Calcium  





  


 


Beta HCG, Quant  





  


 


Urine Color  





  


 


Urine Appearance  





  


 


Urine pH  





  


 


Ur Specific Gravity  





  


 


Urine Protein  





  


 


Urine Ketones  





  


 


Urine Blood  





  


 


Urine Nitrate  





  


 


Urine Bilirubin  





  


 


Urine Urobilinogen  





  


 


Ur Leukocyte Esterase  





  


 


Urine Glucose  





  


 


Patient ABO/Rh  





  











Medications Given: 


 








Discontinued Medications





Sodium Chloride (Ns)  1,000 mls @ 0 mls/hr IV ONCE ONE; Wide Open


   PRN Reason: Protocol


   Stop: 18 21:49


   Last Admin: 18 23:20 Dose:  1,000 mls


Sodium Chloride (Ns)  1,000 mls @ 0 mls/hr IV ONCE ONE; Wide Open


   PRN Reason: Protocol


   Stop: 18 21:49


   Last Admin: 18 22:02 Dose:  1,000 mls


Promethazine HCl (Phenergan)  12.5 mg IVP ONCE ONE


   Stop: 18 21:49


   Last Admin: 18 22:01 Dose:  12.5 mg








Departure





- Departure


Disposition: Home, Routine, Self-Care


Clinical Impression: 


 Second trimester pregnancy, Gastroenteritis





Condition: Good


Instructions:  Nausea and Vomiting in Pregnancy (ED), Gastroenteritis (ED)


Additional Instructions: 


Consume a minimum of 8-10 glasses of water or electrolyte fluid replacement 

drinks that include Gatorade, Powerade, Pedialyte. 


Eat a bland diet for the next 48 hours and then slowly advance as tolerated. 


Take Zofran 1 tab every 4 hours as needed for nausea, vomiting. 


Keep follow-up appointment with OBGYN on Thursday. 





Return to the Emergency Room if symptoms do not resolve in the next 48-72 hours

, you spike a fever > 102  F, or experience intractable abdominal pain/nausea/

vomiting. 


Referrals: 


[facility] [Provider Group] - 11/15/18


Prescriptions: 


Promethazine HCl 25 mg PO Q6 PRN #12 tablet


 PRN Reason: Nausea/Vomiting, Use 1st Adequate: hears normal conversation without difficulty

## 2019-12-24 NOTE — PRE-ANESTHESIA EVALUATION ADULT - NSANTHOSAYNRD_GEN_A_CORE
No. TERESA screening performed.  STOP BANG Legend: 0-2 = LOW Risk; 3-4 = INTERMEDIATE Risk; 5-8 = HIGH Risk

## 2019-12-24 NOTE — CHART NOTE - NSCHARTNOTEFT_GEN_A_CORE
PA Note  Pt seen by OMAYRA Gonzales, received [place] Cytotec for heavy lochia. Pt then seen by myself after passing large 150cc clot.    T(C): 36.7 (12-24-19 @ 08:45), Max: 36.7 (12-24-19 @ 08:45)  HR: 90 (12-24-19 @ 10:50) (78 - 99)  BP: 112/62 (12-24-19 @ 10:50) (95/55 - 120/58)  RR: 15 (12-24-19 @ 10:50) (14 - 18)  SpO2: 95% (12-24-19 @ 10:50) (95% - 97%)    Abd soft, fundus firm, at umbilicus    Plan  s/p [place] Cyto  Methergine series  IV Tylenol for pain  D/W Dr Annie Leon PAC

## 2019-12-25 ENCOUNTER — TRANSCRIPTION ENCOUNTER (OUTPATIENT)
Age: 24
End: 2019-12-25

## 2019-12-25 DIAGNOSIS — Z91.89 OTHER SPECIFIED PERSONAL RISK FACTORS, NOT ELSEWHERE CLASSIFIED: ICD-10-CM

## 2019-12-25 DIAGNOSIS — O26.899 OTHER SPECIFIED PREGNANCY RELATED CONDITIONS, UNSPECIFIED TRIMESTER: ICD-10-CM

## 2019-12-25 LAB
HCT VFR BLD CALC: 32.4 % — LOW (ref 34.5–45)
HGB BLD-MCNC: 10.6 G/DL — LOW (ref 11.5–15.5)
KLEIHAUER-BETKE CALCULATION: 0 % — SIGNIFICANT CHANGE UP (ref 0–0.3)

## 2019-12-25 RX ORDER — PSEUDOEPHEDRINE HCL 30 MG
30 TABLET ORAL EVERY 12 HOURS
Refills: 0 | Status: DISCONTINUED | OUTPATIENT
Start: 2019-12-25 | End: 2019-12-26

## 2019-12-25 RX ADMIN — Medication 0.2 MILLIGRAM(S): at 07:48

## 2019-12-25 RX ADMIN — Medication 30 MILLIGRAM(S): at 12:04

## 2019-12-25 RX ADMIN — Medication 0.2 MILLIGRAM(S): at 12:04

## 2019-12-25 RX ADMIN — Medication 975 MILLIGRAM(S): at 10:30

## 2019-12-25 RX ADMIN — Medication 975 MILLIGRAM(S): at 20:55

## 2019-12-25 RX ADMIN — SIMETHICONE 80 MILLIGRAM(S): 80 TABLET, CHEWABLE ORAL at 19:19

## 2019-12-25 RX ADMIN — Medication 0.2 MILLIGRAM(S): at 11:57

## 2019-12-25 RX ADMIN — Medication 600 MILLIGRAM(S): at 01:35

## 2019-12-25 RX ADMIN — Medication 0.2 MILLIGRAM(S): at 00:38

## 2019-12-25 RX ADMIN — Medication 975 MILLIGRAM(S): at 03:51

## 2019-12-25 RX ADMIN — Medication 600 MILLIGRAM(S): at 18:15

## 2019-12-25 RX ADMIN — Medication 650 MILLIGRAM(S): at 09:51

## 2019-12-25 RX ADMIN — Medication 0.2 MILLIGRAM(S): at 03:51

## 2019-12-25 RX ADMIN — Medication 975 MILLIGRAM(S): at 21:55

## 2019-12-25 RX ADMIN — Medication 600 MILLIGRAM(S): at 12:40

## 2019-12-25 RX ADMIN — Medication 600 MILLIGRAM(S): at 17:40

## 2019-12-25 RX ADMIN — Medication 600 MILLIGRAM(S): at 12:04

## 2019-12-25 RX ADMIN — Medication 600 MILLIGRAM(S): at 00:37

## 2019-12-25 RX ADMIN — Medication 975 MILLIGRAM(S): at 04:50

## 2019-12-25 NOTE — PROGRESS NOTE ADULT - ASSESSMENT
A/P:  24y  PPD # 1      S/P                      doing well    PMHx: , hypothyroidism,   Current Issues: postpartum bleeding s/p [place] cytotec, methergine series  rh neg mother Aneg rh pos baby Apos KB pending for rhogam

## 2019-12-25 NOTE — PROGRESS NOTE ADULT - SUBJECTIVE AND OBJECTIVE BOX
Postpartum Note- PPD#1    Allergies    No Known Allergies    Intolerances        Blood Type  Blood Typing (ABO + Rho D) (19 @ 07:03)    Rh Interpretation: Negative    ABO Interpretation: A      Rubella immune  RPR Negative      S:Patient is a  24y   G [phone]      PPD#1         S/P      Patient w/o complaints, pain is controlled.    Pt is OOB, tolerating PO, passing flatus. Lochia WNL.     O:  Vital Signs Last 24 Hrs  T(C): 36.7 (25 Dec 2019 05:00), Max: 37 (24 Dec 2019 12:30)  T(F): 98 (25 Dec 2019 05:00), Max: 98.6 (24 Dec 2019 12:30)  HR: 73 (25 Dec 2019 05:00) (63 - 99)  BP: 101/69 (25 Dec 2019 05:00) (101/69 - 124/64)  BP(mean): 83 (24 Dec 2019 13:15) (83 - 83)  RR: 18 (25 Dec 2019 05:00) (14 - 18)  SpO2: 98% (25 Dec 2019 05:00) (95% - 98%)     Gen: NAD  Abdomen: Soft, nontender, non-distended, fundus firm.  Lochia WNL  Ext: Neg edema, Neg calf tenderness    LABS:    Hemoglobin: 12.2 g/dL (19 @ 06:13)      Hematocrit: 38.1 % (19 @ 06:13)

## 2019-12-25 NOTE — DISCHARGE NOTE OB - PATIENT PORTAL LINK FT
You can access the FollowMyHealth Patient Portal offered by [facility] by registering at the following website: [email or web address]. By joining [facility]’s FollowMyHealth portal, you will also be able to view your health information using other applications (apps) compatible with our system.

## 2019-12-25 NOTE — DISCHARGE NOTE OB - CARE PROVIDER_API CALL
Chidi Valencia)  Obstetrics and Gynecology  [address] [place] [id number]  Phone: [phone]  Fax: [phone]  Follow Up Time:

## 2019-12-26 VITALS
DIASTOLIC BLOOD PRESSURE: 70 MMHG | TEMPERATURE: 98 F | SYSTOLIC BLOOD PRESSURE: 109 MMHG | HEART RATE: 77 BPM | OXYGEN SATURATION: 99 % | RESPIRATION RATE: 18 BRPM

## 2019-12-26 RX ORDER — OXYCODONE HYDROCHLORIDE 5 MG/1
5 TABLET ORAL
Refills: 0 | Status: DISCONTINUED | OUTPATIENT
Start: 2019-12-26 | End: 2019-12-26

## 2019-12-26 RX ORDER — LEVOTHYROXINE SODIUM 125 MCG
100 TABLET ORAL DAILY
Refills: 0 | Status: DISCONTINUED | OUTPATIENT
Start: 2019-12-26 | End: 2019-12-26

## 2019-12-26 RX ORDER — OXYCODONE HYDROCHLORIDE 5 MG/1
5 TABLET ORAL ONCE
Refills: 0 | Status: DISCONTINUED | OUTPATIENT
Start: 2019-12-26 | End: 2019-12-26

## 2019-12-26 RX ADMIN — OXYCODONE HYDROCHLORIDE 5 MILLIGRAM(S): 5 TABLET ORAL at 13:00

## 2019-12-26 RX ADMIN — SIMETHICONE 80 MILLIGRAM(S): 80 TABLET, CHEWABLE ORAL at 05:16

## 2019-12-26 RX ADMIN — Medication 600 MILLIGRAM(S): at 10:24

## 2019-12-26 RX ADMIN — Medication 600 MILLIGRAM(S): at 11:00

## 2019-12-26 RX ADMIN — Medication 600 MILLIGRAM(S): at 06:14

## 2019-12-26 RX ADMIN — OXYCODONE HYDROCHLORIDE 5 MILLIGRAM(S): 5 TABLET ORAL at 09:57

## 2019-12-26 RX ADMIN — Medication 100 MICROGRAM(S): at 05:13

## 2019-12-26 RX ADMIN — Medication 975 MILLIGRAM(S): at 07:30

## 2019-12-26 RX ADMIN — OXYCODONE HYDROCHLORIDE 5 MILLIGRAM(S): 5 TABLET ORAL at 12:35

## 2019-12-26 RX ADMIN — Medication 975 MILLIGRAM(S): at 12:36

## 2019-12-26 RX ADMIN — SIMETHICONE 80 MILLIGRAM(S): 80 TABLET, CHEWABLE ORAL at 09:59

## 2019-12-26 RX ADMIN — Medication 30 MILLIGRAM(S): at 05:13

## 2019-12-26 RX ADMIN — OXYCODONE HYDROCHLORIDE 5 MILLIGRAM(S): 5 TABLET ORAL at 11:00

## 2019-12-26 RX ADMIN — Medication 975 MILLIGRAM(S): at 06:25

## 2019-12-26 RX ADMIN — Medication 975 MILLIGRAM(S): at 13:00

## 2019-12-26 RX ADMIN — Medication 600 MILLIGRAM(S): at 05:14

## 2019-12-26 NOTE — PROGRESS NOTE ADULT - SUBJECTIVE AND OBJECTIVE BOX
S: Patient doing well. No complaints. Minimal lochia. Pain controlled.    O: Vital Signs Last 24 Hrs  T(C): 36.4 (26 Dec 2019 05:00), Max: 36.8 (25 Dec 2019 09:22)  T(F): 97.6 (26 Dec 2019 05:00), Max: 98.2 (25 Dec 2019 09:22)  HR: 77 (26 Dec 2019 05:00) (77 - 88)  BP: 109/70 (26 Dec 2019 05:00) (104/69 - 109/70)  BP(mean): --  RR: 18 (26 Dec 2019 05:00) (18 - 18)  SpO2: 99% (26 Dec 2019 05:00) (98% - 99%)    Gen: NAD  Abd: soft, Nontender, Nondistended, fundus firm  Ext: no tenderness, mild edema    Labs:                        10.6   x     )-----------( x        ( 25 Dec 2019 08:44 )             32.4       A: 24y PPD#2 s/p  doing well.    Plan:  Routine postpartum care  Encouraged out of bed  Regular diet    Discharge  ELY Valencia MD

## 2020-01-16 ENCOUNTER — RESULT CHARGE (OUTPATIENT)
Age: 25
End: 2020-01-16

## 2020-01-22 NOTE — ED PROVIDER NOTE - NO SIGNIFICANT PAST SURGICAL HISTORY
<<----- Click to add NO significant Past Surgical History 0 = swallows foods/liquids without difficulty

## 2020-02-24 ENCOUNTER — APPOINTMENT (OUTPATIENT)
Dept: GASTROENTEROLOGY | Facility: CLINIC | Age: 25
End: 2020-02-24

## 2020-03-04 ENCOUNTER — TRANSCRIPTION ENCOUNTER (OUTPATIENT)
Age: 25
End: 2020-03-04

## 2020-04-15 ENCOUNTER — APPOINTMENT (OUTPATIENT)
Dept: DERMATOLOGY | Facility: CLINIC | Age: 25
End: 2020-04-15

## 2020-06-30 ENCOUNTER — APPOINTMENT (OUTPATIENT)
Dept: DERMATOLOGY | Facility: CLINIC | Age: 25
End: 2020-06-30
Payer: MEDICAID

## 2020-06-30 DIAGNOSIS — L70.0 ACNE VULGARIS: ICD-10-CM

## 2020-06-30 DIAGNOSIS — L98.9 DISORDER OF THE SKIN AND SUBCUTANEOUS TISSUE, UNSPECIFIED: ICD-10-CM

## 2020-06-30 PROCEDURE — 99214 OFFICE O/P EST MOD 30 MIN: CPT | Mod: 95

## 2020-06-30 RX ORDER — CLINDAMYCIN PHOSPHATE 10 MG/ML
1 LOTION TOPICAL
Qty: 1 | Refills: 6 | Status: ACTIVE | COMMUNITY
Start: 2020-06-30 | End: 1900-01-01

## 2020-07-13 ENCOUNTER — LABORATORY RESULT (OUTPATIENT)
Age: 25
End: 2020-07-13

## 2020-07-13 ENCOUNTER — APPOINTMENT (OUTPATIENT)
Dept: DERMATOLOGY | Facility: CLINIC | Age: 25
End: 2020-07-13
Payer: MEDICAID

## 2020-07-13 VITALS — BODY MASS INDEX: 27.31 KG/M2 | HEIGHT: 64 IN | WEIGHT: 160 LBS

## 2020-07-13 VITALS — TEMPERATURE: 97.7 F

## 2020-07-13 DIAGNOSIS — L91.0 HYPERTROPHIC SCAR: ICD-10-CM

## 2020-07-13 DIAGNOSIS — B36.0 PITYRIASIS VERSICOLOR: ICD-10-CM

## 2020-07-13 DIAGNOSIS — D48.9 NEOPLASM OF UNCERTAIN BEHAVIOR, UNSPECIFIED: ICD-10-CM

## 2020-07-13 PROCEDURE — 11102 TANGNTL BX SKIN SINGLE LES: CPT | Mod: 59

## 2020-07-13 PROCEDURE — 99213 OFFICE O/P EST LOW 20 MIN: CPT | Mod: 25

## 2020-07-13 PROCEDURE — 11900 INJECT SKIN LESIONS </W 7: CPT | Mod: 59

## 2020-07-13 RX ORDER — KETOCONAZOLE 20 MG/G
2 CREAM TOPICAL TWICE DAILY
Qty: 1 | Refills: 3 | Status: ACTIVE | COMMUNITY
Start: 2020-07-13 | End: 1900-01-01

## 2020-07-13 RX ORDER — KETOCONAZOLE 20.5 MG/ML
2 SHAMPOO, SUSPENSION TOPICAL
Qty: 1 | Refills: 6 | Status: ACTIVE | COMMUNITY
Start: 2020-07-13 | End: 1900-01-01

## 2020-08-04 ENCOUNTER — APPOINTMENT (OUTPATIENT)
Dept: ENDOCRINOLOGY | Facility: CLINIC | Age: 25
End: 2020-08-04

## 2020-08-21 ENCOUNTER — OUTPATIENT (OUTPATIENT)
Dept: OUTPATIENT SERVICES | Facility: HOSPITAL | Age: 25
LOS: 1 days | End: 2020-08-21
Payer: MEDICAID

## 2020-08-21 ENCOUNTER — APPOINTMENT (OUTPATIENT)
Dept: ULTRASOUND IMAGING | Facility: CLINIC | Age: 25
End: 2020-08-21

## 2020-08-21 DIAGNOSIS — Z00.8 ENCOUNTER FOR OTHER GENERAL EXAMINATION: ICD-10-CM

## 2020-08-21 PROCEDURE — 76856 US EXAM PELVIC COMPLETE: CPT

## 2020-08-21 PROCEDURE — 76856 US EXAM PELVIC COMPLETE: CPT | Mod: 26

## 2020-09-01 ENCOUNTER — APPOINTMENT (OUTPATIENT)
Dept: ULTRASOUND IMAGING | Facility: CLINIC | Age: 25
End: 2020-09-01
Payer: MEDICAID

## 2020-09-01 ENCOUNTER — OUTPATIENT (OUTPATIENT)
Dept: OUTPATIENT SERVICES | Facility: HOSPITAL | Age: 25
LOS: 1 days | End: 2020-09-01
Payer: COMMERCIAL

## 2020-09-01 DIAGNOSIS — Z71.9 COUNSELING, UNSPECIFIED: ICD-10-CM

## 2020-09-01 PROCEDURE — 76700 US EXAM ABDOM COMPLETE: CPT | Mod: 26

## 2020-09-01 PROCEDURE — 76700 US EXAM ABDOM COMPLETE: CPT

## 2020-10-06 ENCOUNTER — RX RENEWAL (OUTPATIENT)
Age: 25
End: 2020-10-06

## 2020-10-20 RX ORDER — LEVOTHYROXINE SODIUM 0.1 MG/1
100 TABLET ORAL
Qty: 110 | Refills: 1 | Status: ACTIVE | COMMUNITY
Start: 2018-10-16 | End: 1900-01-01

## 2020-10-28 ENCOUNTER — APPOINTMENT (OUTPATIENT)
Dept: ENDOCRINOLOGY | Facility: CLINIC | Age: 25
End: 2020-10-28

## 2020-11-05 ENCOUNTER — TRANSCRIPTION ENCOUNTER (OUTPATIENT)
Age: 25
End: 2020-11-05

## 2020-11-06 ENCOUNTER — NON-APPOINTMENT (OUTPATIENT)
Age: 25
End: 2020-11-06

## 2021-03-10 ENCOUNTER — APPOINTMENT (OUTPATIENT)
Dept: ANTEPARTUM | Facility: CLINIC | Age: 26
End: 2021-03-10
Payer: COMMERCIAL

## 2021-03-10 ENCOUNTER — ASOB RESULT (OUTPATIENT)
Age: 26
End: 2021-03-10

## 2021-03-10 PROCEDURE — 99072 ADDL SUPL MATRL&STAF TM PHE: CPT

## 2021-03-10 PROCEDURE — 76811 OB US DETAILED SNGL FETUS: CPT

## 2021-04-19 ENCOUNTER — TRANSCRIPTION ENCOUNTER (OUTPATIENT)
Age: 26
End: 2021-04-19

## 2021-04-20 PROBLEM — Z00.00 ENCOUNTER FOR PREVENTIVE HEALTH EXAMINATION: Noted: 2021-04-20

## 2021-05-09 ENCOUNTER — EMERGENCY (EMERGENCY)
Facility: HOSPITAL | Age: 26
LOS: 1 days | Discharge: HOME | End: 2021-05-09
Attending: EMERGENCY MEDICINE
Payer: COMMERCIAL

## 2021-05-09 VITALS
HEART RATE: 100 BPM | HEIGHT: 64 IN | OXYGEN SATURATION: 99 % | SYSTOLIC BLOOD PRESSURE: 118 MMHG | TEMPERATURE: 99 F | WEIGHT: 192.02 LBS | RESPIRATION RATE: 16 BRPM | DIASTOLIC BLOOD PRESSURE: 61 MMHG

## 2021-05-09 VITALS
OXYGEN SATURATION: 100 % | TEMPERATURE: 98 F | HEART RATE: 80 BPM | DIASTOLIC BLOOD PRESSURE: 56 MMHG | SYSTOLIC BLOOD PRESSURE: 123 MMHG | RESPIRATION RATE: 16 BRPM

## 2021-05-09 PROCEDURE — 99284 EMERGENCY DEPT VISIT MOD MDM: CPT | Mod: 25

## 2021-05-09 PROCEDURE — 96365 THER/PROPH/DIAG IV INF INIT: CPT

## 2021-05-09 PROCEDURE — 76815 OB US LIMITED FETUS(S): CPT | Mod: 26

## 2021-05-09 PROCEDURE — 76815 OB US LIMITED FETUS(S): CPT

## 2021-05-09 PROCEDURE — 96375 TX/PRO/DX INJ NEW DRUG ADDON: CPT

## 2021-05-09 PROCEDURE — 99284 EMERGENCY DEPT VISIT MOD MDM: CPT

## 2021-05-09 RX ORDER — DIPHENHYDRAMINE HCL 50 MG
25 CAPSULE ORAL ONCE
Refills: 0 | Status: COMPLETED | OUTPATIENT
Start: 2021-05-09 | End: 2021-05-09

## 2021-05-09 RX ORDER — METOCLOPRAMIDE HCL 10 MG
10 TABLET ORAL ONCE
Refills: 0 | Status: COMPLETED | OUTPATIENT
Start: 2021-05-09 | End: 2021-05-09

## 2021-05-09 RX ORDER — SODIUM CHLORIDE 9 MG/ML
1000 INJECTION INTRAMUSCULAR; INTRAVENOUS; SUBCUTANEOUS ONCE
Refills: 0 | Status: COMPLETED | OUTPATIENT
Start: 2021-05-09 | End: 2021-05-09

## 2021-05-09 RX ORDER — MAGNESIUM SULFATE 500 MG/ML
2 VIAL (ML) INJECTION ONCE
Refills: 0 | Status: COMPLETED | OUTPATIENT
Start: 2021-05-09 | End: 2021-05-09

## 2021-05-09 RX ORDER — ACETAMINOPHEN 500 MG
975 TABLET ORAL ONCE
Refills: 0 | Status: COMPLETED | OUTPATIENT
Start: 2021-05-09 | End: 2021-05-09

## 2021-05-09 RX ORDER — PROCHLORPERAZINE MALEATE 5 MG
10 TABLET ORAL ONCE
Refills: 0 | Status: DISCONTINUED | OUTPATIENT
Start: 2021-05-09 | End: 2021-05-09

## 2021-05-09 RX ADMIN — Medication 2 GRAM(S): at 19:00

## 2021-05-09 RX ADMIN — SODIUM CHLORIDE 1000 MILLILITER(S): 9 INJECTION INTRAMUSCULAR; INTRAVENOUS; SUBCUTANEOUS at 20:20

## 2021-05-09 RX ADMIN — Medication 50 GRAM(S): at 18:23

## 2021-05-09 RX ADMIN — Medication 10 MILLIGRAM(S): at 18:22

## 2021-05-09 RX ADMIN — Medication 25 MILLIGRAM(S): at 18:23

## 2021-05-09 RX ADMIN — Medication 975 MILLIGRAM(S): at 20:20

## 2021-05-09 NOTE — ED PROVIDER NOTE - CARE PROVIDER_API CALL
Chidi Valencia)  Obstetrics and Gynecology  [address] [place] [address], [address]  [place] [address]  Phone: [phone]  Fax: [phone]  Established Patient  Follow Up Time: 1-3 Days

## 2021-05-09 NOTE — ED PROVIDER NOTE - CLINICAL SUMMARY MEDICAL DECISION MAKING FREE TEXT BOX
24 y/o F mhx hypothyroidism p/w intractable ha x 4 days likely 2/2 migraine. PE unremarkable, neuro exam fully intact, no redflag ROS. Reglan, mag, benadryl. Will f/u and reassess. 24 y/o F mhx hypothyroidism p/w intractable ha x 4 days likely 2/2 migraine. PE unremarkable, neuro exam fully intact, no redflag ROS. Reglan, mag, benadryl. Will f/u and reassess.  Chadwick Yao MD, FACEP: In this physician's medical judgement based on clinical history and physical exam, patient with frontal headache, no acute onset, pt has had headache since 5/6. No nausea/vomiting, no visual disturbance, no neck pain. blood pressure within normal limits, non-tachycardic, Visual and Objective Pain Evaluation: Patient in no acute distress, no facial grimacing, no writhing or movement of extremities, normal relaxed position, lying quietly and moves easily, no crying/whimpering/sobbing/complaining at rest, content and relaxed at rest and when observed from outside of the room, non-tachycardic, normal blood pressure without elevation from reported pain, patient noted sleeping at times in the emergency department. Neuro exam benign. normal coordination, patient with improvement with iv therapies and fluid administration, ob/gyn would like ct head and labs for hypertension in pregnancy, however, patient without neuro deficits and blood pressure <120/80 on average. Patient not willing to stay for NST or further work up and evaluation.  will discharge to ob/gyn at patient's request.

## 2021-05-09 NOTE — ED PROVIDER NOTE - ATTENDING CONTRIBUTION TO CARE
Chadwick Yao MD, FACEP   The patient was serially evaluated throughout emergency department course. There was no acute deterioration up to this time in the department. Patient has demonstrated clinical improvement and is stable, feels better at this time according to emergency department team. Agree with goals/plan of emergency department care as described in this physician's electronic medical record, including diagnostics, therapeutics and consultation as clinically warranted. Will discharge home with close outpatient follow up with primary care physician/provider and specialist if necessary. The patient and/or family was educated on concerning signs and features to return to the emergency department, in layman terms, including but not limited to: nausea, vomiting, fever, chills, persistent/worsening symptoms or any concerns at all. No other immediate life threatening issues present on history, clinical exam, or any diagnostic evaluation. The patient and/or family/guardian were given the opportunity to ask questions and have them answered in full. The patient and/or family/guarding are with capacity and insight into the situation, treatment, risks, benefits, alternative therapies, and understand that they can ask any further questions if needed. Patient is otherwise a safe disposition home, has capacity and insight into their condition, no further questions in the emergency department and will follow up with their doctor(s) this week. Patient and/or family/guardian understands anticipatory guidance and was given strict return and follow up precautions. The patient and/or family/guardian has been informed, in layman terms, of all concerning signs and symptoms to return to Emergency Department, the necessity to follow up with the PMD/Clinic/follow up provided within 2-3 days was explained, and the patient and/or family/guardian reports understanding of above with capacity and insight. The patient and/or family/guardian were informed of any results of their tests and are were encouraged to follow up on the findings with their doctor as well as the need to inform their doctor of any results. The patient and/or family/guardian are aware of the need to follow up with repeat testing as applicable and report understanding of the above with capacity and insight. The patient and/or family/guardian was made aware of any pending test results at the time of discharge and of the need to call back for the final results a well as the need to inform their doctor of the results.

## 2021-05-09 NOTE — ED ADULT NURSE NOTE - OBJECTIVE STATEMENT
Pt is a 25 year old female,  , 27 weeks pregnant.  Presents to ED with c/o I've had a migraine since Thursday afternoon."   Tylenol, Sumatriptan were not working, pt took 600mg Motrin this morning with minimal relief

## 2021-05-09 NOTE — ED PROVIDER NOTE - NSFOLLOWUPINSTRUCTIONS_ED_ALL_ED_FT
Follow up with our OB/GYN clinic in 1-2 days if you cannot arrange an appointment with your OB/GYN or cannot get a referral by your primary medical doctor, call [phone] or [phone] and arrange your appointment.     Return for following but not limited to: headaches that are persistent or reoccurring, if you are unable to sleep, if there is any nausea, vomiting, or if there is any change in vision, numbness, weakness, acute severe headache or any concern at all.    There were no signs of an emergency medical condition on completion of today's workup.  You will need further medical care and evaluation. A presumptive diagnosis has been made, however further evaluation may be required by your primary care doctor or specialist for a definitive diagnosis.      Follow up with your medical doctor in 2-3 days or call our clinic at [phone] and state you were seen in the Emergency Department and would like to be seen in clinic. You may also call [phone] to speak with a representative to assist follow up care with medicine, surgery, or specialists.    If you are having pain, take Tylenol/acetaminophen 1 g every six hours as needed for pain.    Be sure to take no more than 4000mg or 4g of Tylenol/acetaminophen in a 24 hour period. Be sure to check your other medications to see if they include Tylenol/acetaminophen and include them in your calculations to ensure you do not take more than 4000mg or 4g of Tylenol/acetaminophen a day.    Drink at least 2 Liters or 64 Ounces of water each day (UNLESS you are supposed to restrict fluids or have a history of congestive heart failure (CHF)).    Return for any persistent, worsening symptoms, or ANY concerns at all.

## 2021-05-09 NOTE — ED PROVIDER NOTE - OBJECTIVE STATEMENT
26 y/o F  27 weeks pregnant p/w ha x 4 days, described 26 y/o F  27 weeks pregnant mhx of hypothyroidism p/w ha x 4 days, described as frontal, throbbing, constant not relieved by tylenol or sumatriptan. Patient does not have hx of migranes. Endorses photophobia. Patient does not report f/c, n/v, blurry vision, lightheadedness, numbness/tingling, weakness. 26 y/o F  27 weeks pregnant mhx of hypothyroidism p/w ha x 4 days, described as frontal, throbbing, constant not relieved by tylenol or sumatriptan (). Patient does not have a formal hx of migraines. Patient later stated that she this is worse than her prior 'migraines'. Endorses photophobia. Patient does not report f/c, n/v, blurry vision, lightheadedness, numbness/tingling, weakness. No neck pain.

## 2021-05-09 NOTE — ED PROVIDER NOTE - PHYSICAL EXAMINATION
General: well appearing   HEENT: neck supple, anicteric sclera  Cardiovascular: Normal s1, s2, RRR  Respiratory: CTA b/l   Abdominal: Soft, ntnd  Extremities: No swelling in LEs  Neurologic: normal gait, ftn intact, 5/5 strength, sensation intact   Psych: Awake, alert answering questions appropriately General: well appearing   HEENT: neck supple, anicteric sclera  Cardiovascular: Normal s1, s2, RRR  Respiratory: CTA b/l   Abdominal: Soft, ntnd  Extremities: No swelling in LEs, no edema  Neurologic: normal gait, ftn intact, 5/5 strength, sensation intact   Chadwick Yao MD, FACEP CN 2 normal sight, 3,4,6 eomi and eyelid movement normal, 5,7 normal gritting of teeth and opening of mouth and sensation to face, 8 normal hearing for patient, 9,10 normal swallowing and phonation, 11 normal shoulder shrug, 12 normal tongue movement and articulation, normal coordination, normal finger to nose, normal heel to shin, negative Romberg, no pronator drift, no gait instability, 5/5 strength in upper and lower extremities, normal sensory throughout, alert and oriented to person, place, time, and situation.   Psych: Awake, alert answering questions appropriately

## 2021-05-09 NOTE — ED ADULT TRIAGE NOTE - CHIEF COMPLAINT QUOTE
"I've had a migraine since Thursday afternoon." 27 weeks pregnant  Tylenol, Sumatriptan were not working, pt took 600mg Motrin this morning with minimal relief

## 2021-05-09 NOTE — ED ADULT NURSE REASSESSMENT NOTE - NS ED NURSE REASSESS COMMENT FT1
PT provided with warm blankets and stretcher adjusted for comfort.  Side rail up and stretcher locked for safety.

## 2021-05-09 NOTE — ED PROVIDER NOTE - PROGRESS NOTE DETAILS
patient with improved headache now 2-4/10, no nausea/vomiting, headache was not sudden, without nausea/vomiting, no altered mental status, no neck pain Patient states headache improved, she does not wish to stay for NST CT head or blood work as requested by OB/GYN resident. Patient states her ride is here and she wishes to leave at this time.   Chadwick Yao MD, FACEP: Historical features, symptoms, and clinical exam not consistent with SAH, hypertension is not present, patient without visual disturbance, or focal neurological signs.    No other immediate life threatening issues present on history, clinical exam, or any diagnostic evaluation. The patient and/or family/guardian were given the opportunity to ask questions and have them answered in full. The patient and/or family/guarding are with capacity and insight into the situation, treatment, risks, benefits, alternative therapies, and understand that they can ask any further questions if needed. Patient is otherwise a safe disposition home, has capacity and insight into their condition, no further questions in the emergency department and will follow up with their doctor(s) this week. Patient and/or family/guardian understands anticipatory guidance and was given strict return and follow up precautions. The patient and/or family/guardian has been informed, in layman terms, of all concerning signs and symptoms to return to Emergency Department, the necessity to follow up with the PMD/Clinic/follow up provided within 2-3 days was explained, and the patient and/or family/guardian reports understanding of above with capacity and insight. The patient and/or family/guardian were informed of any results of their tests and are were encouraged to follow up on the findings with their doctor as well as the need to inform their doctor of any results. The patient and/or family/guardian are aware of the need to follow up with repeat testing as applicable and report understanding of the above with capacity and insight. The patient and/or family/guardian was made aware of any pending test results at the time of discharge and of the need to call back for the final results a well as the need to inform their doctor of the results.

## 2021-05-09 NOTE — ED PROVIDER NOTE - NS ED ROS FT
General: no fever, chills  HENT: no nasal congestion  Eyes: no visual changes, no blurred vision  Neck: no neck pain  CV: denies chest pain, no palpitations  Resp: no difficulty breathing, no cough  Abdominal: no nausea, no vomiting, no diarrhea, no abdominal pain, no blood in stool, no dark stool  MSK: no muscle aches  Neuro: +headaches, no numbness, no tingling, no dizziness, no lightheadedness  Skin: no rashes

## 2021-05-09 NOTE — ED ADULT NURSE NOTE - NSIMPLEMENTINTERV_GEN_ALL_ED
HLD (hyperlipidemia)
Implemented All Universal Safety Interventions:  [place] to call system. Call bell, personal items and telephone within reach. Instruct patient to call for assistance. Room bathroom lighting operational. Non-slip footwear when patient is off stretcher. Physically safe environment: no spills, clutter or unnecessary equipment. Stretcher in lowest position, wheels locked, appropriate side rails in place.

## 2021-05-09 NOTE — ED PROVIDER NOTE - PATIENT PORTAL LINK FT
You can access the FollowMyHealth Patient Portal offered by [facility] by registering at the following website: [email or web address]. By joining [facility]’s FollowMyHealth portal, you will also be able to view your health information using other applications (apps) compatible with our system.

## 2021-05-11 ENCOUNTER — TRANSCRIPTION ENCOUNTER (OUTPATIENT)
Age: 26
End: 2021-05-11

## 2021-08-07 ENCOUNTER — INPATIENT (INPATIENT)
Facility: HOSPITAL | Age: 26
LOS: 1 days | Discharge: HOME | End: 2021-08-09
Attending: OBSTETRICS & GYNECOLOGY | Admitting: STUDENT IN AN ORGANIZED HEALTH CARE EDUCATION/TRAINING PROGRAM
Payer: COMMERCIAL

## 2021-08-07 VITALS
DIASTOLIC BLOOD PRESSURE: 68 MMHG | OXYGEN SATURATION: 98 % | SYSTOLIC BLOOD PRESSURE: 139 MMHG | WEIGHT: 214.95 LBS | RESPIRATION RATE: 18 BRPM | TEMPERATURE: 98 F | HEIGHT: 64 IN | HEART RATE: 91 BPM

## 2021-08-07 DIAGNOSIS — Z33.1 PREGNANT STATE, INCIDENTAL: ICD-10-CM

## 2021-08-07 RX ORDER — CITRIC ACID/SODIUM CITRATE 300-500 MG
15 SOLUTION, ORAL ORAL EVERY 6 HOURS
Refills: 0 | Status: DISCONTINUED | OUTPATIENT
Start: 2021-08-07 | End: 2021-08-08

## 2021-08-07 RX ORDER — SODIUM CHLORIDE 9 MG/ML
1000 INJECTION, SOLUTION INTRAVENOUS
Refills: 0 | Status: DISCONTINUED | OUTPATIENT
Start: 2021-08-07 | End: 2021-08-08

## 2021-08-07 RX ORDER — OXYTOCIN 10 UNIT/ML
333.33 VIAL (ML) INJECTION
Qty: 20 | Refills: 0 | Status: DISCONTINUED | OUTPATIENT
Start: 2021-08-07 | End: 2021-08-08

## 2021-08-07 NOTE — OB RN PATIENT PROFILE - PRO FIRST TIME PARENT YN OB
I was out of the office from 6/25 - 7/7/20. It appears that pt was treated in [facility] for this rash on 6/29. no

## 2021-08-08 LAB
BASOPHILS # BLD AUTO: 0.04 K/UL — SIGNIFICANT CHANGE UP (ref 0–0.2)
BASOPHILS NFR BLD AUTO: 0.5 % — SIGNIFICANT CHANGE UP (ref 0–2)
COVID-19 SPIKE DOMAIN AB INTERP: POSITIVE
COVID-19 SPIKE DOMAIN ANTIBODY RESULT: >250 U/ML — HIGH
EOSINOPHIL # BLD AUTO: 0.06 K/UL — SIGNIFICANT CHANGE UP (ref 0–0.5)
EOSINOPHIL NFR BLD AUTO: 0.7 % — SIGNIFICANT CHANGE UP (ref 0–6)
HCT VFR BLD CALC: 35.3 % — SIGNIFICANT CHANGE UP (ref 34.5–45)
HGB BLD-MCNC: 11.7 G/DL — SIGNIFICANT CHANGE UP (ref 11.5–15.5)
IMM GRANULOCYTES NFR BLD AUTO: 1 % — SIGNIFICANT CHANGE UP (ref 0–1.5)
LYMPHOCYTES # BLD AUTO: 2.24 K/UL — SIGNIFICANT CHANGE UP (ref 1–3.3)
LYMPHOCYTES # BLD AUTO: 25.6 % — SIGNIFICANT CHANGE UP (ref 13–44)
MCHC RBC-ENTMCNC: 30.2 PG — SIGNIFICANT CHANGE UP (ref 27–34)
MCHC RBC-ENTMCNC: 33.1 GM/DL — SIGNIFICANT CHANGE UP (ref 32–36)
MCV RBC AUTO: 91.2 FL — SIGNIFICANT CHANGE UP (ref 80–100)
MONOCYTES # BLD AUTO: 0.7 K/UL — SIGNIFICANT CHANGE UP (ref 0–0.9)
MONOCYTES NFR BLD AUTO: 8 % — SIGNIFICANT CHANGE UP (ref 2–14)
NEUTROPHILS # BLD AUTO: 5.61 K/UL — SIGNIFICANT CHANGE UP (ref 1.8–7.4)
NEUTROPHILS NFR BLD AUTO: 64.2 % — SIGNIFICANT CHANGE UP (ref 43–77)
NRBC # BLD: 0 /100 WBCS — SIGNIFICANT CHANGE UP (ref 0–0)
PLATELET # BLD AUTO: 247 K/UL — SIGNIFICANT CHANGE UP (ref 150–400)
RBC # BLD: 3.87 M/UL — SIGNIFICANT CHANGE UP (ref 3.8–5.2)
RBC # FLD: 15.8 % — HIGH (ref 10.3–14.5)
SARS-COV-2 IGG+IGM SERPL QL IA: >250 U/ML — HIGH
SARS-COV-2 IGG+IGM SERPL QL IA: POSITIVE
SARS-COV-2 RNA SPEC QL NAA+PROBE: SIGNIFICANT CHANGE UP
T PALLIDUM AB TITR SER: NEGATIVE — SIGNIFICANT CHANGE UP
WBC # BLD: 8.74 K/UL — SIGNIFICANT CHANGE UP (ref 3.8–10.5)
WBC # FLD AUTO: 8.74 K/UL — SIGNIFICANT CHANGE UP (ref 3.8–10.5)

## 2021-08-08 RX ORDER — IBUPROFEN 200 MG
600 TABLET ORAL EVERY 6 HOURS
Refills: 0 | Status: DISCONTINUED | OUTPATIENT
Start: 2021-08-08 | End: 2021-08-08

## 2021-08-08 RX ORDER — IBUPROFEN 200 MG
600 TABLET ORAL EVERY 6 HOURS
Refills: 0 | Status: DISCONTINUED | OUTPATIENT
Start: 2021-08-08 | End: 2021-08-09

## 2021-08-08 RX ORDER — PRAMOXINE HYDROCHLORIDE 150 MG/15G
1 AEROSOL, FOAM RECTAL EVERY 4 HOURS
Refills: 0 | Status: DISCONTINUED | OUTPATIENT
Start: 2021-08-08 | End: 2021-08-09

## 2021-08-08 RX ORDER — TETANUS TOXOID, REDUCED DIPHTHERIA TOXOID AND ACELLULAR PERTUSSIS VACCINE, ADSORBED 5; 2.5; 8; 8; 2.5 [IU]/.5ML; [IU]/.5ML; UG/.5ML; UG/.5ML; UG/.5ML
0.5 SUSPENSION INTRAMUSCULAR ONCE
Refills: 0 | Status: DISCONTINUED | OUTPATIENT
Start: 2021-08-08 | End: 2021-08-09

## 2021-08-08 RX ORDER — DIPHENHYDRAMINE HCL 50 MG
25 CAPSULE ORAL EVERY 6 HOURS
Refills: 0 | Status: DISCONTINUED | OUTPATIENT
Start: 2021-08-08 | End: 2021-08-09

## 2021-08-08 RX ORDER — ACETAMINOPHEN 500 MG
650 TABLET ORAL EVERY 6 HOURS
Refills: 0 | Status: DISCONTINUED | OUTPATIENT
Start: 2021-08-08 | End: 2021-08-09

## 2021-08-08 RX ORDER — ACETAMINOPHEN 500 MG
975 TABLET ORAL
Refills: 0 | Status: DISCONTINUED | OUTPATIENT
Start: 2021-08-08 | End: 2021-08-08

## 2021-08-08 RX ORDER — DIBUCAINE 1 %
1 OINTMENT (GRAM) RECTAL EVERY 6 HOURS
Refills: 0 | Status: DISCONTINUED | OUTPATIENT
Start: 2021-08-08 | End: 2021-08-09

## 2021-08-08 RX ORDER — OXYTOCIN 10 UNIT/ML
333.33 VIAL (ML) INJECTION
Qty: 20 | Refills: 0 | Status: DISCONTINUED | OUTPATIENT
Start: 2021-08-08 | End: 2021-08-09

## 2021-08-08 RX ORDER — SODIUM CHLORIDE 9 MG/ML
3 INJECTION INTRAMUSCULAR; INTRAVENOUS; SUBCUTANEOUS EVERY 8 HOURS
Refills: 0 | Status: DISCONTINUED | OUTPATIENT
Start: 2021-08-08 | End: 2021-08-09

## 2021-08-08 RX ORDER — IBUPROFEN 200 MG
600 TABLET ORAL EVERY 6 HOURS
Refills: 0 | Status: COMPLETED | OUTPATIENT
Start: 2021-08-08 | End: 2022-07-07

## 2021-08-08 RX ORDER — KETOROLAC TROMETHAMINE 30 MG/ML
30 SYRINGE (ML) INJECTION ONCE
Refills: 0 | Status: DISCONTINUED | OUTPATIENT
Start: 2021-08-08 | End: 2021-08-09

## 2021-08-08 RX ORDER — ACETAMINOPHEN 500 MG
1000 TABLET ORAL ONCE
Refills: 0 | Status: COMPLETED | OUTPATIENT
Start: 2021-08-08 | End: 2021-08-08

## 2021-08-08 RX ORDER — MAGNESIUM HYDROXIDE 400 MG/1
30 TABLET, CHEWABLE ORAL
Refills: 0 | Status: DISCONTINUED | OUTPATIENT
Start: 2021-08-08 | End: 2021-08-09

## 2021-08-08 RX ORDER — AER TRAVELER 0.5 G/1
1 SOLUTION RECTAL; TOPICAL EVERY 4 HOURS
Refills: 0 | Status: DISCONTINUED | OUTPATIENT
Start: 2021-08-08 | End: 2021-08-09

## 2021-08-08 RX ORDER — OXYCODONE HYDROCHLORIDE 5 MG/1
5 TABLET ORAL ONCE
Refills: 0 | Status: DISCONTINUED | OUTPATIENT
Start: 2021-08-08 | End: 2021-08-09

## 2021-08-08 RX ORDER — LANOLIN
1 OINTMENT (GRAM) TOPICAL EVERY 6 HOURS
Refills: 0 | Status: DISCONTINUED | OUTPATIENT
Start: 2021-08-08 | End: 2021-08-09

## 2021-08-08 RX ORDER — SIMETHICONE 80 MG/1
80 TABLET, CHEWABLE ORAL EVERY 4 HOURS
Refills: 0 | Status: DISCONTINUED | OUTPATIENT
Start: 2021-08-08 | End: 2021-08-09

## 2021-08-08 RX ORDER — BENZOCAINE 10 %
1 GEL (GRAM) MUCOUS MEMBRANE EVERY 6 HOURS
Refills: 0 | Status: DISCONTINUED | OUTPATIENT
Start: 2021-08-08 | End: 2021-08-09

## 2021-08-08 RX ORDER — OXYTOCIN 10 UNIT/ML
4 VIAL (ML) INJECTION
Qty: 30 | Refills: 0 | Status: DISCONTINUED | OUTPATIENT
Start: 2021-08-08 | End: 2021-08-08

## 2021-08-08 RX ORDER — OXYCODONE HYDROCHLORIDE 5 MG/1
5 TABLET ORAL
Refills: 0 | Status: DISCONTINUED | OUTPATIENT
Start: 2021-08-08 | End: 2021-08-09

## 2021-08-08 RX ORDER — HYDROCORTISONE 1 %
1 OINTMENT (GRAM) TOPICAL EVERY 6 HOURS
Refills: 0 | Status: DISCONTINUED | OUTPATIENT
Start: 2021-08-08 | End: 2021-08-09

## 2021-08-08 RX ADMIN — Medication 650 MILLIGRAM(S): at 21:21

## 2021-08-08 RX ADMIN — Medication 400 MILLIGRAM(S): at 04:17

## 2021-08-08 RX ADMIN — Medication 600 MILLIGRAM(S): at 19:20

## 2021-08-08 RX ADMIN — Medication 600 MILLIGRAM(S): at 18:50

## 2021-08-08 RX ADMIN — Medication 0.2 MILLIGRAM(S): at 13:01

## 2021-08-08 RX ADMIN — Medication 0.2 MILLIGRAM(S): at 07:39

## 2021-08-08 RX ADMIN — Medication 0.2 MILLIGRAM(S): at 17:16

## 2021-08-08 RX ADMIN — Medication 1000 MILLIUNIT(S)/MIN: at 06:32

## 2021-08-08 RX ADMIN — Medication 600 MILLIGRAM(S): at 13:49

## 2021-08-08 RX ADMIN — Medication 650 MILLIGRAM(S): at 16:20

## 2021-08-08 RX ADMIN — SODIUM CHLORIDE 125 MILLILITER(S): 9 INJECTION, SOLUTION INTRAVENOUS at 00:24

## 2021-08-08 RX ADMIN — Medication 975 MILLIGRAM(S): at 09:58

## 2021-08-08 RX ADMIN — SODIUM CHLORIDE 125 MILLILITER(S): 9 INJECTION, SOLUTION INTRAVENOUS at 02:42

## 2021-08-08 RX ADMIN — Medication 600 MILLIGRAM(S): at 14:49

## 2021-08-08 RX ADMIN — Medication 15 MILLILITER(S): at 01:37

## 2021-08-08 RX ADMIN — Medication 975 MILLIGRAM(S): at 10:58

## 2021-08-08 RX ADMIN — Medication 0.2 MILLIGRAM(S): at 21:21

## 2021-08-08 RX ADMIN — Medication 650 MILLIGRAM(S): at 17:20

## 2021-08-08 RX ADMIN — Medication 4 MILLIUNIT(S)/MIN: at 04:01

## 2021-08-08 RX ADMIN — Medication 650 MILLIGRAM(S): at 21:51

## 2021-08-08 NOTE — PROVIDER CONTACT NOTE (OTHER) - ASSESSMENT
Pt resting in bed, VS stable see flowsheet. Routine fundal check performed- uterus is firm without massage, midline, at umbilicus. Rubra colored blood noted with golf ball size clot expressed. Pt resting in bed, VS stable see flowsheet. Routine fundal check performed- uterus is firm without massage, midline, at umbilicus. Lochia rubra colored with golf ball size clot expressed.

## 2021-08-08 NOTE — OB PROVIDER H&P - HISTORY OF PRESENT ILLNESS
R1 L&D H&P    Pt is a 25 y/o  pt of Dr. Valencia at 40w4d admitted for rrIOL. Pt has no complaints. Denies VB, LOF, contractions. Endorses GFM. Prenatal course uncomplicated.    GBS neg  EFW 3629    OBHx:  2016 FT  6#  2019 FT  9#    GynHx: denies h/o abnormal pap smears, STIs, fibroids    PMHx:  - hypothyroidism  - denies h/o asthma, HTN, diabetes    PSHx: denies    Med: PNV, synthroid 137 mcg qD    All: NKDA    SH: denies EtOH, tobacco, marijuana, illicit drug use    VS:Vital Signs Last 24 Hrs  T(C): 36.7 (07 Aug 2021 23:29), Max: 36.7 (07 Aug 2021 23:29)  T(F): 98.1 (07 Aug 2021 23:29), Max: 98.1 (07 Aug 2021 23:29)  HR: 82 (08 Aug 2021 01:16) (80 - 95)  BP: 138/69 (08 Aug 2021 01:05) (131/70 - 139/68)  BP(mean): --  RR: 18 (07 Aug 2021 23:29) (18 - 18)  SpO2: 98% (08 Aug 2021 01:16) (96% - 100%)  GA:A+Ox3  Cards: RRR  Pulm: CTAB  EFH: 145/mod/+accels/-decels  [place]: irritability  VE: 2/50/-3  TAUS: vertex

## 2021-08-08 NOTE — OB RN DELIVERY SUMMARY - NS_SEPSISRSKCALC_OBGYN_ALL_OB_FT
EOS calculated successfully. EOS Risk Factor: 0.5/1000 live births ([facility] national incidence); GA=40w4d; Temp=98.1; ROM=0.967; GBS='Negative'; Antibiotics='No antibiotics or any antibiotics < 2 hrs prior to birth'

## 2021-08-08 NOTE — PROVIDER CONTACT NOTE (OTHER) - ACTION/TREATMENT ORDERED:
additional 300ebl per md russell   methergine series per md sen
MD Romero aware of clot express during routine checks. MD to come to bedside, give medication as per order, no further intervention required, will continue to monitor.

## 2021-08-08 NOTE — OB PROVIDER DELIVERY SUMMARY - NSPROVIDERDELIVERYNOTE_OBGYN_ALL_OB_FT
LEON NL FEMALE INFANT  APGARS 9/9  PLACENTA SPONT INTACT---UTERUS EXPLORED AND EMPTY  CYTOTEC 1000 MCG GIVEN PROPHYLACTICALLY 2/2 HX UTERINE ATONY  2ND DEG LAC REPAIRED   CC'S  PT TO RR IN GOOD CONDITION  ELY OROSCO MD

## 2021-08-08 NOTE — OB PROVIDER LABOR PROGRESS NOTE - ASSESSMENT
attg note  cx 6/90/-1  fhr cat 1  arm clear  epid in place  pit at 4  cont present mgmt  t mckinley brewer
rrIOL at 40w4d. Fetal status is reassuring. Patient in early labor.    -c/w pitocin    D/w Dr. Annie Eddy PGY-4

## 2021-08-08 NOTE — OB PROVIDER LABOR PROGRESS NOTE - NS_SUBJECTIVE/OBJECTIVE_OBGYN_ALL_OB_FT
At bedside for VE. CB noted to be displaced. Patient comfortable with epidural. Has mild headache and requested Tylenol but cannot swallow pills.

## 2021-08-08 NOTE — CHART NOTE - NSCHARTNOTEFT_GEN_A_CORE
R4 Note    Called to bedside as patient passing clots. Patient had an uncomplicated delivery and received prophylactic rectal cytotec for EBL of 350.     Vital Signs Last 24 Hrs  T(C): 36.9 (08 Aug 2021 06:26), Max: 36.9 (08 Aug 2021 06:26)  T(F): 98.4 (08 Aug 2021 06:26), Max: 98.4 (08 Aug 2021 06:26)  HR: 89 (08 Aug 2021 07:11) (79 - 98)  BP: 120/69 (08 Aug 2021 07:11) (106/75 - 167/67)  BP(mean): --  RR: 18 (07 Aug 2021 23:29) (18 - 18)  SpO2: 99% (08 Aug 2021 02:46) (96% - 100%)    Patient well appearing, NAD  abd soft, NT, fundus firm and above umbilicus  approx 200 cc of clot on chux. On manual evacuation another 100 cc evacuated.  No additional bleeding noted    Will start patient on Methergine series for PPH. She is otherwise stable at this time.    D/w Dr. Annie Eddy PGY-4

## 2021-08-08 NOTE — OB RN DELIVERY SUMMARY - NSSELHIDDEN_OBGYN_ALL_OB_FT
[NS_DeliveryAttending1_OBGYN_ALL_OB_FT:[id number]] [NS_DeliveryAttending1_OBGYN_ALL_OB_FT:[id number]],[NS_DeliveryRN_OBGYN_ALL_OB_FT:[id number]=]

## 2021-08-08 NOTE — OB PROVIDER H&P - ASSESSMENT
A&P: Pt is a 27 y/o  at 40w4d admitted for rrIOL.  Labor: admit to L&D  -epidural then cervical balloon  -pitocin 4x4 at 4am  -routine labs  -EFM/toco  -NPO, IV hydration    Fetal: cat 1 tracing, fetal status reassuring  GBS: neg  Analgesia: epidural    Plan discussed with attending Dr. Annie Phelps, PGY-1

## 2021-08-09 ENCOUNTER — TRANSCRIPTION ENCOUNTER (OUTPATIENT)
Age: 26
End: 2021-08-09

## 2021-08-09 VITALS
RESPIRATION RATE: 18 BRPM | SYSTOLIC BLOOD PRESSURE: 114 MMHG | TEMPERATURE: 98 F | HEART RATE: 86 BPM | OXYGEN SATURATION: 99 % | DIASTOLIC BLOOD PRESSURE: 75 MMHG

## 2021-08-09 PROCEDURE — 86901 BLOOD TYPING SEROLOGIC RH(D): CPT

## 2021-08-09 PROCEDURE — 59025 FETAL NON-STRESS TEST: CPT

## 2021-08-09 PROCEDURE — 87635 SARS-COV-2 COVID-19 AMP PRB: CPT

## 2021-08-09 PROCEDURE — 85025 COMPLETE CBC W/AUTO DIFF WBC: CPT

## 2021-08-09 PROCEDURE — 59050 FETAL MONITOR W/REPORT: CPT

## 2021-08-09 PROCEDURE — 86769 SARS-COV-2 COVID-19 ANTIBODY: CPT

## 2021-08-09 PROCEDURE — 86780 TREPONEMA PALLIDUM: CPT

## 2021-08-09 PROCEDURE — 86900 BLOOD TYPING SEROLOGIC ABO: CPT

## 2021-08-09 PROCEDURE — 86850 RBC ANTIBODY SCREEN: CPT

## 2021-08-09 RX ADMIN — Medication 600 MILLIGRAM(S): at 11:51

## 2021-08-09 RX ADMIN — Medication 0.2 MILLIGRAM(S): at 00:50

## 2021-08-09 RX ADMIN — Medication 650 MILLIGRAM(S): at 09:08

## 2021-08-09 RX ADMIN — Medication 600 MILLIGRAM(S): at 00:23

## 2021-08-09 RX ADMIN — Medication 600 MILLIGRAM(S): at 12:21

## 2021-08-09 RX ADMIN — Medication 650 MILLIGRAM(S): at 09:38

## 2021-08-09 RX ADMIN — Medication 0.2 MILLIGRAM(S): at 09:08

## 2021-08-09 RX ADMIN — Medication 600 MILLIGRAM(S): at 00:53

## 2021-08-09 RX ADMIN — Medication 600 MILLIGRAM(S): at 05:53

## 2021-08-09 RX ADMIN — Medication 0.2 MILLIGRAM(S): at 05:23

## 2021-08-09 RX ADMIN — Medication 600 MILLIGRAM(S): at 05:23

## 2021-08-09 NOTE — DISCHARGE NOTE OB - CARE PROVIDER_API CALL
Chidi Valencia)  Obstetrics and Gynecology  [address] [place] [address], [address]  [place] [address]  Phone: [phone]  Fax: [phone]  Follow Up Time:

## 2021-08-09 NOTE — PROGRESS NOTE ADULT - PROBLEM SELECTOR PLAN 1
reg diet  ambulating  pain control  dc home    wolf brewer
Plan:   - Continue scheduled Ibuprofen and Acetaminophen for pain, Oxycodone available PRN for breakthrough pain.  - Increase ambulation, SCDs when not ambulating  - Continue regular diet  - PPD #1 H/H this morning     Amyeo Afroz Jereen, PGY-1

## 2021-08-09 NOTE — DISCHARGE NOTE OB - MEDICATION SUMMARY - MEDICATIONS TO TAKE
I will START or STAY ON the medications listed below when I get home from the hospital:    acetaminophen 325 mg oral tablet  -- 3 tab(s) by mouth every 6 hours  -- Indication: For Pain    ibuprofen 600 mg oral tablet  -- 1 tab(s) by mouth every 6 hours  -- Indication: For Pain    Prenatal Multivitamins with Folic Acid 1 mg oral tablet  -- 1 tab(s) by mouth once a day  -- Indication: For vitamin    levothyroxine 100 mcg (0.1 mg) oral tablet  -- 1 tab(s) by mouth once a day  -- Indication: For thyroid

## 2021-08-09 NOTE — PROGRESS NOTE ADULT - SUBJECTIVE AND OBJECTIVE BOX
OB Postpartum Progress Note: PPD #1     25yo   now  PPD #1 after  seen and examined at bedside, no acute overnight events. Patient denies current complaints, her pain is well controlled. States she is ambulating, voiding spontaneously, passing gas, and tolerating regular diet. Denies CP, SOB, N/V, HA, blurred vision, epigastric pain.    Vital Signs Last 24 Hours  T(C): 36.4 (21 @ 05:30), Max: 37.2 (21 @ 13:30)  HR: 86 (21 @ 05:30) (84 - 99)  BP: 114/75 (21 @ 05:30) (102/69 - 114/75)  RR: 18 (21 @ 05:30) (16 - 18)  SpO2: 99% (21 @ 05:30) (97% - 100%)    Physical Exam:  General: NAD, resting comfortably in bed   Abdomen: Soft, non-tender, non-distended, fundus firm  Extremities: Full ROM, moving all extremities spontaneously  Pelvic: Lochia wnl    Labs:    Blood Type: A Negative  Antibody Screen: Negative  RPR: Negative               11.7   8.74  )-----------( 247      (  @ 00:44 )             35.3         MEDICATIONS  (STANDING):  acetaminophen    Suspension .. 650 milliGRAM(s) Oral every 6 hours  diphtheria/tetanus/pertussis (acellular) Vaccine (ADAcel) 0.5 milliLiter(s) IntraMuscular once  ibuprofen  Suspension. 600 milliGRAM(s) Oral every 6 hours  ketorolac   Injectable 30 milliGRAM(s) IV Push once  methylergonovine 0.2 milliGRAM(s) Oral every 4 hours  misoprostol 1000 MICROGram(s) Rectal once  oxytocin Infusion 333.333 milliUNIT(s)/Min (1000 mL/Hr) IV Continuous <Continuous>  prenatal multivitamin 1 Tablet(s) Oral daily  sodium chloride 0.9% lock flush 3 milliLiter(s) IV Push every 8 hours    MEDICATIONS  (PRN):  benzocaine 20%/menthol 0.5% Spray 1 Spray(s) Topical every 6 hours PRN for Perineal discomfort  dibucaine 1% Ointment 1 Application(s) Topical every 6 hours PRN Perineal discomfort  diphenhydrAMINE 25 milliGRAM(s) Oral every 6 hours PRN Pruritus  hydrocortisone 1% Cream 1 Application(s) Topical every 6 hours PRN Moderate Pain (4-6)  lanolin Ointment 1 Application(s) Topical every 6 hours PRN nipple soreness  magnesium hydroxide Suspension 30 milliLiter(s) Oral two times a day PRN Constipation  oxyCODONE    IR 5 milliGRAM(s) Oral every 3 hours PRN Moderate to Severe Pain (4-10)  oxyCODONE    IR 5 milliGRAM(s) Oral once PRN Moderate to Severe Pain (4-10)  pramoxine 1%/zinc 5% Cream 1 Application(s) Topical every 4 hours PRN Moderate Pain (4-6)  simethicone 80 milliGRAM(s) Chew every 4 hours PRN Gas  witch hazel Pads 1 Application(s) Topical every 4 hours PRN Perineal discomfort        
S: Patient is doing well without complaints. Tolerates regular diet. She states lochia is in WNL. Ambulating without difficulty. Denies N/V. Voiding freely. Passing flatus. Pain well controlled with oral pain medications. Denies any HA/vision changes, CP/SOB, F/C/S.    O: Vital Signs Last 24 Hrs  T(C): 36.4 (09 Aug 2021 05:30), Max: 37.2 (08 Aug 2021 13:30)  T(F): 97.5 (09 Aug 2021 05:30), Max: 99 (08 Aug 2021 13:30)  HR: 86 (09 Aug 2021 05:30) (83 - 99)  BP: 114/75 (09 Aug 2021 05:30) (102/69 - 114/75)  BP(mean): 83 (08 Aug 2021 09:00) (76 - 83)  RR: 18 (09 Aug 2021 05:30) (16 - 18)  SpO2: 99% (09 Aug 2021 05:30) (97% - 100%)    Physical Exam:  General: NAD  Abdomen: soft, non-tender, non-distended, fundus firm  Vaginal: deferred  Ext: NTBL    Labs:             11.7   8.74  )-----------( 247      ( 08-08 @ 00:44 )             35.3                   MEDICATIONS  (STANDING):  acetaminophen    Suspension .. 650 milliGRAM(s) Oral every 6 hours  diphtheria/tetanus/pertussis (acellular) Vaccine (ADAcel) 0.5 milliLiter(s) IntraMuscular once  ibuprofen  Suspension. 600 milliGRAM(s) Oral every 6 hours  ketorolac   Injectable 30 milliGRAM(s) IV Push once  methylergonovine 0.2 milliGRAM(s) Oral every 4 hours  misoprostol 1000 MICROGram(s) Rectal once  oxytocin Infusion 333.333 milliUNIT(s)/Min (1000 mL/Hr) IV Continuous <Continuous>  prenatal multivitamin 1 Tablet(s) Oral daily  sodium chloride 0.9% lock flush 3 milliLiter(s) IV Push every 8 hours

## 2021-08-09 NOTE — DISCHARGE NOTE OB - PATIENT PORTAL LINK FT
You can access the FollowMyHealth Patient Portal offered by [facility] by registering at the following website: [email or web address]. By joining [facility]’s FollowMyHealth portal, you will also be able to view your health information using other applications (apps) compatible with our system.

## 2021-11-21 ENCOUNTER — TRANSCRIPTION ENCOUNTER (OUTPATIENT)
Age: 26
End: 2021-11-21

## 2022-01-02 ENCOUNTER — EMERGENCY (EMERGENCY)
Facility: HOSPITAL | Age: 27
LOS: 1 days | Discharge: HOME | End: 2022-01-02
Attending: EMERGENCY MEDICINE
Payer: COMMERCIAL

## 2022-01-02 VITALS
HEIGHT: 64 IN | SYSTOLIC BLOOD PRESSURE: 124 MMHG | DIASTOLIC BLOOD PRESSURE: 76 MMHG | RESPIRATION RATE: 17 BRPM | OXYGEN SATURATION: 96 % | TEMPERATURE: 98 F | HEART RATE: 75 BPM | WEIGHT: 162.04 LBS

## 2022-01-02 VITALS
OXYGEN SATURATION: 99 % | TEMPERATURE: 99 F | DIASTOLIC BLOOD PRESSURE: 88 MMHG | SYSTOLIC BLOOD PRESSURE: 132 MMHG | HEART RATE: 68 BPM

## 2022-01-02 PROCEDURE — 99284 EMERGENCY DEPT VISIT MOD MDM: CPT

## 2022-01-02 PROCEDURE — 99282 EMERGENCY DEPT VISIT SF MDM: CPT

## 2022-01-02 NOTE — ED PROVIDER NOTE - NSFOLLOWUPINSTRUCTIONS_ED_ALL_ED_FT
1. You were seen in the ED for body aches and fatigue. Your examination was normal. Your oxygen level was normal. It is possible you have covid again, however the illness is mild at this time.      2. You may use Tylenol 650mg every 8 hours or Motrin 600mg every 8 hours as needed for pain. These are over the counter medications.      3. Please see your lung doctor as scheduled this Wednesday.      4. Return to the ED for worsening trouble breathing or any other concerns.

## 2022-01-02 NOTE — ED PROVIDER NOTE - CLINICAL SUMMARY MEDICAL DECISION MAKING FREE TEXT BOX
Attending MD Gaines:  26F presenting with fatigue, body aches, cough and dyspnea x 7 days. Previously had covid 1 year prior, unvaccinated though. Patient here is well appearing, clear lungs, O2 sat 99%. Patient was concerned that her home oximeter read 85%, though this was likely spurious given pt's well appearance. Patient had covid pcr sent from urgent care today, test pending. Patient's presentation suspicious for covid-19, mild in severity. patient is not at risk for progression to severe illness. Patient explained aftercare supportive care, follow up already scheduled with her pulmonologist on wednesday.       *The above represents an initial assessment/impression. Please refer to progress notes for potential changes in patient clinical course*

## 2022-01-02 NOTE — ED PROVIDER NOTE - PHYSICAL EXAMINATION
T(C): 37 (02 Jan 2022 22:42), Max: 37 (02 Jan 2022 22:42)  T(F): 98.6 (02 Jan 2022 22:42), Max: 98.6 (02 Jan 2022 22:42)  HR: 68 (02 Jan 2022 22:42) (68 - 75)  BP: 132/88 (02 Jan 2022 22:42) (124/76 - 132/88)  RR: 17 (02 Jan 2022 19:46) (17 - 17)  SpO2: 99% (02 Jan 2022 22:42) (96% - 99%)      Attending MD Gaines:    Gen:  Well appearing, conversational, no apparent distress   Neck: supple, no meningimus  CV: heart with reg rhythm, no obvious murmur appreciated. radial pulses 2+ bilaterally, upper and lower extremities warm to the touch   Resp: clear to auscultation anteriorly bilaterally, breathing comfortably  Abd: soft, NT, ND  Pysch: appropriate affect    Neuro: moves all extremities spontaneously

## 2022-01-02 NOTE — ED ADULT TRIAGE NOTE - MEANS OF ARRIVAL
ambulatory pt brought to ED with abd pain, reporting sudden onset a half hour ago. AOX3, afebrile, c.o nausea. reports pain tranveling down her left side. pt screaming for pain medication, states allergy to ibuprofen/NSAIDs. pt with even and unlabored resps present, pt brought to ED with abd pain, reporting sudden onset a half hour ago. AOX3, afebrile, c.o nausea. reports pain traveling down her left side. pt screaming for pain medication, states allergy to ibuprofen/NSAIDs. pt with even and unlabored resps present, writhing around in stretcher. no urinary complaints, no fevers, no CVA tenderness on exam. denies hematuria. ambulated from wheelchair to ED stretcher.

## 2022-01-02 NOTE — ED PROVIDER NOTE - OBJECTIVE STATEMENT
26F presenting with body aches, diarrhea, fatigue, cough and dyspnea x 1 week. The patient states she went to urgent care and underwent covid PCR, awaiting results today. She presents to the ED today because her home pulse oximeter read 85%, prompting her to call her pulm doctor's office. The covering doctor recommended ED presentation. The patient is taking Nyquil and Dayquil for symptoms. The patient contracted covid 1 year prior, is not currently vaccinated. States she was diagnosed with "post covid dyspnea" after her infection initially.

## 2022-01-02 NOTE — ED PROVIDER NOTE - PATIENT PORTAL LINK FT
You can access the FollowMyHealth Patient Portal offered by [facility] by registering at the following website: [email or web address]. By joining [employer]’s FollowMyHealth portal, you will also be able to view your health information using other applications (apps) compatible with our system.

## 2022-03-03 ENCOUNTER — APPOINTMENT (OUTPATIENT)
Dept: ULTRASOUND IMAGING | Facility: CLINIC | Age: 27
End: 2022-03-03

## 2022-03-03 ENCOUNTER — APPOINTMENT (OUTPATIENT)
Dept: OTOLARYNGOLOGY | Facility: CLINIC | Age: 27
End: 2022-03-03
Payer: COMMERCIAL

## 2022-03-03 VITALS
HEIGHT: 64 IN | BODY MASS INDEX: 25.95 KG/M2 | TEMPERATURE: 98.2 F | SYSTOLIC BLOOD PRESSURE: 113 MMHG | HEART RATE: 75 BPM | WEIGHT: 152 LBS | DIASTOLIC BLOOD PRESSURE: 70 MMHG

## 2022-03-03 DIAGNOSIS — H93.13 TINNITUS, BILATERAL: ICD-10-CM

## 2022-03-03 DIAGNOSIS — J34.2 DEVIATED NASAL SEPTUM: ICD-10-CM

## 2022-03-03 DIAGNOSIS — H93.8X3 OTHER SPECIFIED DISORDERS OF EAR, BILATERAL: ICD-10-CM

## 2022-03-03 DIAGNOSIS — H61.21 IMPACTED CERUMEN, RIGHT EAR: ICD-10-CM

## 2022-03-03 DIAGNOSIS — H69.83 OTHER SPECIFIED DISORDERS OF EUSTACHIAN TUBE, BILATERAL: ICD-10-CM

## 2022-03-03 PROCEDURE — 69210 REMOVE IMPACTED EAR WAX UNI: CPT

## 2022-03-03 PROCEDURE — 99214 OFFICE O/P EST MOD 30 MIN: CPT | Mod: 25

## 2022-03-03 PROCEDURE — 31231 NASAL ENDOSCOPY DX: CPT

## 2022-03-03 RX ORDER — FLUTICASONE PROPIONATE 50 UG/1
50 SPRAY, METERED NASAL DAILY
Qty: 1 | Refills: 3 | Status: ACTIVE | COMMUNITY
Start: 2022-03-03 | End: 1900-01-01

## 2022-03-03 NOTE — REVIEW OF SYSTEMS
[Ear Pain] : ear pain [Dizziness] : dizziness [Ear Noises] : ear noises [Nasal Congestion] : nasal congestion [Negative] : Heme/Lymph

## 2022-03-03 NOTE — ASSESSMENT
[FreeTextEntry1] : Complaining of clogged ears and some pressure feeling a little off and some new onset tinnitus.  Examination massive cerumen impaction right ear curetted out leaving a lot of his symptoms left ear is fine nasal endoscopy shows a lot of swelling and a deviated septum to the left I put her on Flonase nasal spray reassured her that the tinnitus is probably self-limiting and may been due to the wax and she will follow-up and see us and if that were to persist a full audiologic evaluation will be considered.

## 2022-03-03 NOTE — HISTORY OF PRESENT ILLNESS
[de-identified] : Patient has had a faint ringing in both ears that comes and goes as well as an ache in the ears  when she lays down. SHe has minor dizziness that comes and goes. She does not believe there have been any changes in her hearing. She does not have any recent or current nasal congestion or runny nose but admits that during the majority of the winter she was having nasal congestion. She does not have any issues with her throat today

## 2022-03-03 NOTE — END OF VISIT
[FreeTextEntry3] : I saw and examined this patient in person. I have discussed with Yuli Parsons, Physician Assistant, in detail the above note and agree with the above assessment and plan of care.\par

## 2022-03-15 ENCOUNTER — OUTPATIENT (OUTPATIENT)
Dept: OUTPATIENT SERVICES | Facility: HOSPITAL | Age: 27
LOS: 1 days | End: 2022-03-15
Payer: COMMERCIAL

## 2022-03-15 ENCOUNTER — RESULT REVIEW (OUTPATIENT)
Age: 27
End: 2022-03-15

## 2022-03-15 ENCOUNTER — APPOINTMENT (OUTPATIENT)
Dept: ULTRASOUND IMAGING | Facility: CLINIC | Age: 27
End: 2022-03-15
Payer: COMMERCIAL

## 2022-03-15 DIAGNOSIS — N30.20 OTHER CHRONIC CYSTITIS WITHOUT HEMATURIA: ICD-10-CM

## 2022-03-15 DIAGNOSIS — N32.9 BLADDER DISORDER, UNSPECIFIED: ICD-10-CM

## 2022-03-15 PROCEDURE — 76770 US EXAM ABDO BACK WALL COMP: CPT | Mod: 26

## 2022-03-15 PROCEDURE — 76770 US EXAM ABDO BACK WALL COMP: CPT

## 2022-03-17 ENCOUNTER — APPOINTMENT (OUTPATIENT)
Dept: ULTRASOUND IMAGING | Facility: CLINIC | Age: 27
End: 2022-03-17

## 2022-06-06 ENCOUNTER — APPOINTMENT (OUTPATIENT)
Dept: OTOLARYNGOLOGY | Facility: CLINIC | Age: 27
End: 2022-06-06

## 2022-08-07 ENCOUNTER — NON-APPOINTMENT (OUTPATIENT)
Age: 27
End: 2022-08-07

## 2022-09-26 ENCOUNTER — APPOINTMENT (OUTPATIENT)
Dept: DERMATOLOGY | Facility: CLINIC | Age: 27
End: 2022-09-26

## 2022-12-15 ENCOUNTER — APPOINTMENT (OUTPATIENT)
Dept: ULTRASOUND IMAGING | Facility: CLINIC | Age: 27
End: 2022-12-15

## 2023-01-04 ENCOUNTER — APPOINTMENT (OUTPATIENT)
Dept: ULTRASOUND IMAGING | Facility: CLINIC | Age: 28
End: 2023-01-04
Payer: COMMERCIAL

## 2023-01-04 PROCEDURE — 76536 US EXAM OF HEAD AND NECK: CPT

## 2023-01-31 ENCOUNTER — APPOINTMENT (OUTPATIENT)
Dept: OTOLARYNGOLOGY | Facility: CLINIC | Age: 28
End: 2023-01-31

## 2023-03-02 ENCOUNTER — APPOINTMENT (OUTPATIENT)
Dept: OTOLARYNGOLOGY | Facility: CLINIC | Age: 28
End: 2023-03-02
Payer: COMMERCIAL

## 2023-03-02 VITALS
WEIGHT: 185 LBS | TEMPERATURE: 98 F | BODY MASS INDEX: 31.58 KG/M2 | DIASTOLIC BLOOD PRESSURE: 78 MMHG | HEART RATE: 88 BPM | HEIGHT: 64 IN | SYSTOLIC BLOOD PRESSURE: 121 MMHG

## 2023-03-02 DIAGNOSIS — J39.2 OTHER DISEASES OF PHARYNX: ICD-10-CM

## 2023-03-02 DIAGNOSIS — R07.0 PAIN IN THROAT: ICD-10-CM

## 2023-03-02 PROCEDURE — 99214 OFFICE O/P EST MOD 30 MIN: CPT | Mod: 25

## 2023-03-02 PROCEDURE — 31575 DIAGNOSTIC LARYNGOSCOPY: CPT

## 2023-03-02 NOTE — HISTORY OF PRESENT ILLNESS
[de-identified] : PAtient has had about 6 months of persistent throat irritation and pain. She feels dryness in the throat and has some mild pain with swallowing. \par She is currently 12 weeks pregnant but admits that this throat issue started before she was pregnant. \par She also states she has some bumps intermittently appearing behind the ears.

## 2023-03-02 NOTE — END OF VISIT
[FreeTextEntry3] : I, Dr. Snow personally performed the evaluation and management (E/M) services , including all procedures, for this established patient who presents today with (a) new problem(s)/exacerbation of (an) existing condition(s). That E/M includes conducting the clinically appropriate interval history &/or exam, assessing all new/exacerbated conditions, and establishing a new plan of care. Today, my LEON, Yuli Parsons, was here to observe &/or participate in the visit & follow plan of care established by me.\par \par \par

## 2023-03-02 NOTE — CONSULT LETTER
[Dear  ___] : Dear  [unfilled], [Consult Letter:] : I had the pleasure of evaluating your patient, [unfilled]. [Please see my note below.] : Please see my note below. [Consult Closing:] : Thank you very much for allowing me to participate in the care of this patient.  If you have any questions, please do not hesitate to contact me. [Sincerely,] : Sincerely, [FreeTextEntry3] : Aiden Snow MD\par [facility]\par Otolaryngology and Facial Plastics\par Associated Professor, [employer]\par

## 2023-03-02 NOTE — ASSESSMENT
[FreeTextEntry1] : Patient 14 weeks pregnant concerned about some throat discomfort is here for evaluation of her larynx fiberoptically no tumors or masses she was reassured everything is fine that could be very well some underlying reflux which she has had in the past have encouraged her to follow-up with her gastroenterologist to advise during her this is her fourth pregnancy.

## 2023-05-01 ENCOUNTER — ASOB RESULT (OUTPATIENT)
Age: 28
End: 2023-05-01

## 2023-05-01 ENCOUNTER — APPOINTMENT (OUTPATIENT)
Dept: ANTEPARTUM | Facility: CLINIC | Age: 28
End: 2023-05-01
Payer: COMMERCIAL

## 2023-05-01 PROCEDURE — 76811 OB US DETAILED SNGL FETUS: CPT

## 2023-07-20 ENCOUNTER — APPOINTMENT (OUTPATIENT)
Dept: OBGYN | Facility: CLINIC | Age: 28
End: 2023-07-20
Payer: COMMERCIAL

## 2023-07-20 PROCEDURE — 76819 FETAL BIOPHYS PROFIL W/O NST: CPT

## 2023-07-20 PROCEDURE — 0502F SUBSEQUENT PRENATAL CARE: CPT

## 2023-07-20 PROCEDURE — 76816 OB US FOLLOW-UP PER FETUS: CPT

## 2023-07-20 PROCEDURE — 90715 TDAP VACCINE 7 YRS/> IM: CPT

## 2023-07-20 PROCEDURE — 90471 IMMUNIZATION ADMIN: CPT

## 2023-08-04 ENCOUNTER — APPOINTMENT (OUTPATIENT)
Dept: OBGYN | Facility: CLINIC | Age: 28
End: 2023-08-04
Payer: COMMERCIAL

## 2023-08-04 PROCEDURE — 0502F SUBSEQUENT PRENATAL CARE: CPT

## 2023-08-17 ENCOUNTER — APPOINTMENT (OUTPATIENT)
Dept: OBGYN | Facility: CLINIC | Age: 28
End: 2023-08-17
Payer: COMMERCIAL

## 2023-08-17 PROCEDURE — 0502F SUBSEQUENT PRENATAL CARE: CPT

## 2023-08-25 ENCOUNTER — APPOINTMENT (OUTPATIENT)
Dept: OBGYN | Facility: CLINIC | Age: 28
End: 2023-08-25
Payer: COMMERCIAL

## 2023-08-25 PROCEDURE — 76816 OB US FOLLOW-UP PER FETUS: CPT

## 2023-08-25 PROCEDURE — 0502F SUBSEQUENT PRENATAL CARE: CPT

## 2023-08-25 PROCEDURE — 76818 FETAL BIOPHYS PROFILE W/NST: CPT | Mod: 59

## 2023-08-31 ENCOUNTER — APPOINTMENT (OUTPATIENT)
Dept: OBGYN | Facility: CLINIC | Age: 28
End: 2023-08-31
Payer: COMMERCIAL

## 2023-08-31 PROCEDURE — 0502F SUBSEQUENT PRENATAL CARE: CPT

## 2023-08-31 PROCEDURE — 59425 ANTEPARTUM CARE ONLY: CPT

## 2023-09-04 ENCOUNTER — INPATIENT (INPATIENT)
Facility: HOSPITAL | Age: 28
LOS: 1 days | Discharge: HOME | End: 2023-09-06
Attending: OBSTETRICS & GYNECOLOGY | Admitting: OBSTETRICS & GYNECOLOGY
Payer: COMMERCIAL

## 2023-09-04 ENCOUNTER — OUTPATIENT (OUTPATIENT)
Dept: OUTPATIENT SERVICES | Facility: HOSPITAL | Age: 28
LOS: 1 days | End: 2023-09-04
Payer: COMMERCIAL

## 2023-09-04 VITALS
DIASTOLIC BLOOD PRESSURE: 61 MMHG | OXYGEN SATURATION: 97 % | HEART RATE: 114 BPM | RESPIRATION RATE: 16 BRPM | TEMPERATURE: 99 F | SYSTOLIC BLOOD PRESSURE: 105 MMHG

## 2023-09-04 VITALS
SYSTOLIC BLOOD PRESSURE: 119 MMHG | HEART RATE: 100 BPM | DIASTOLIC BLOOD PRESSURE: 59 MMHG | RESPIRATION RATE: 16 BRPM | TEMPERATURE: 99 F

## 2023-09-04 VITALS
HEART RATE: 106 BPM | RESPIRATION RATE: 16 BRPM | SYSTOLIC BLOOD PRESSURE: 119 MMHG | TEMPERATURE: 99 F | DIASTOLIC BLOOD PRESSURE: 65 MMHG

## 2023-09-04 DIAGNOSIS — Z34.80 ENCOUNTER FOR SUPERVISION OF OTHER NORMAL PREGNANCY, UNSPECIFIED TRIMESTER: ICD-10-CM

## 2023-09-04 DIAGNOSIS — O26.899 OTHER SPECIFIED PREGNANCY RELATED CONDITIONS, UNSPECIFIED TRIMESTER: ICD-10-CM

## 2023-09-04 LAB
BASOPHILS # BLD AUTO: 0.04 K/UL — SIGNIFICANT CHANGE UP (ref 0–0.2)
BASOPHILS NFR BLD AUTO: 0.5 % — SIGNIFICANT CHANGE UP (ref 0–2)
EOSINOPHIL # BLD AUTO: 0.01 K/UL — SIGNIFICANT CHANGE UP (ref 0–0.5)
EOSINOPHIL NFR BLD AUTO: 0.1 % — SIGNIFICANT CHANGE UP (ref 0–6)
HCT VFR BLD CALC: 33.1 % — LOW (ref 34.5–45)
HGB BLD-MCNC: 10.9 G/DL — LOW (ref 11.5–15.5)
IMM GRANULOCYTES NFR BLD AUTO: 0.5 % — SIGNIFICANT CHANGE UP (ref 0–0.9)
LYMPHOCYTES # BLD AUTO: 1.03 K/UL — SIGNIFICANT CHANGE UP (ref 1–3.3)
LYMPHOCYTES # BLD AUTO: 12.7 % — LOW (ref 13–44)
MCHC RBC-ENTMCNC: 29 PG — SIGNIFICANT CHANGE UP (ref 27–34)
MCHC RBC-ENTMCNC: 32.9 GM/DL — SIGNIFICANT CHANGE UP (ref 32–36)
MCV RBC AUTO: 88 FL — SIGNIFICANT CHANGE UP (ref 80–100)
MONOCYTES # BLD AUTO: 0.53 K/UL — SIGNIFICANT CHANGE UP (ref 0–0.9)
MONOCYTES NFR BLD AUTO: 6.5 % — SIGNIFICANT CHANGE UP (ref 2–14)
NEUTROPHILS # BLD AUTO: 6.45 K/UL — SIGNIFICANT CHANGE UP (ref 1.8–7.4)
NEUTROPHILS NFR BLD AUTO: 79.7 % — HIGH (ref 43–77)
NRBC # BLD: 0 /100 WBCS — SIGNIFICANT CHANGE UP (ref 0–0)
PLATELET # BLD AUTO: 243 K/UL — SIGNIFICANT CHANGE UP (ref 150–400)
RBC # BLD: 3.76 M/UL — LOW (ref 3.8–5.2)
RBC # FLD: 16.3 % — HIGH (ref 10.3–14.5)
WBC # BLD: 8.1 K/UL — SIGNIFICANT CHANGE UP (ref 3.8–10.5)
WBC # FLD AUTO: 8.1 K/UL — SIGNIFICANT CHANGE UP (ref 3.8–10.5)

## 2023-09-04 PROCEDURE — 99203 OFFICE O/P NEW LOW 30 MIN: CPT | Mod: 1L

## 2023-09-04 PROCEDURE — 86077 PHYS BLOOD BANK SERV XMATCH: CPT

## 2023-09-04 PROCEDURE — 59410 OBSTETRICAL CARE: CPT

## 2023-09-04 PROCEDURE — G0463: CPT

## 2023-09-04 RX ORDER — DIBUCAINE 1 %
1 OINTMENT (GRAM) RECTAL EVERY 6 HOURS
Refills: 0 | Status: DISCONTINUED | OUTPATIENT
Start: 2023-09-04 | End: 2023-09-06

## 2023-09-04 RX ORDER — OXYTOCIN 10 UNIT/ML
333.33 VIAL (ML) INJECTION
Qty: 20 | Refills: 0 | Status: DISCONTINUED | OUTPATIENT
Start: 2023-09-04 | End: 2023-09-06

## 2023-09-04 RX ORDER — LANOLIN
1 OINTMENT (GRAM) TOPICAL EVERY 6 HOURS
Refills: 0 | Status: DISCONTINUED | OUTPATIENT
Start: 2023-09-04 | End: 2023-09-06

## 2023-09-04 RX ORDER — SODIUM CHLORIDE 9 MG/ML
1000 INJECTION, SOLUTION INTRAVENOUS
Refills: 0 | Status: DISCONTINUED | OUTPATIENT
Start: 2023-09-04 | End: 2023-09-04

## 2023-09-04 RX ORDER — DIPHENHYDRAMINE HCL 50 MG
25 CAPSULE ORAL EVERY 6 HOURS
Refills: 0 | Status: DISCONTINUED | OUTPATIENT
Start: 2023-09-04 | End: 2023-09-06

## 2023-09-04 RX ORDER — CHLORHEXIDINE GLUCONATE 213 G/1000ML
1 SOLUTION TOPICAL DAILY
Refills: 0 | Status: DISCONTINUED | OUTPATIENT
Start: 2023-09-04 | End: 2023-09-05

## 2023-09-04 RX ORDER — BENZOCAINE 10 %
1 GEL (GRAM) MUCOUS MEMBRANE EVERY 6 HOURS
Refills: 0 | Status: DISCONTINUED | OUTPATIENT
Start: 2023-09-04 | End: 2023-09-06

## 2023-09-04 RX ORDER — OXYCODONE HYDROCHLORIDE 5 MG/1
5 TABLET ORAL
Refills: 0 | Status: DISCONTINUED | OUTPATIENT
Start: 2023-09-04 | End: 2023-09-06

## 2023-09-04 RX ORDER — OXYTOCIN 10 UNIT/ML
41.67 VIAL (ML) INJECTION
Qty: 20 | Refills: 0 | Status: DISCONTINUED | OUTPATIENT
Start: 2023-09-04 | End: 2023-09-06

## 2023-09-04 RX ORDER — OXYTOCIN 10 UNIT/ML
4 VIAL (ML) INJECTION
Qty: 30 | Refills: 0 | Status: DISCONTINUED | OUTPATIENT
Start: 2023-09-04 | End: 2023-09-04

## 2023-09-04 RX ORDER — IBUPROFEN 200 MG
600 TABLET ORAL EVERY 6 HOURS
Refills: 0 | Status: COMPLETED | OUTPATIENT
Start: 2023-09-04 | End: 2024-08-02

## 2023-09-04 RX ORDER — KETOROLAC TROMETHAMINE 30 MG/ML
30 SYRINGE (ML) INJECTION ONCE
Refills: 0 | Status: DISCONTINUED | OUTPATIENT
Start: 2023-09-04 | End: 2023-09-04

## 2023-09-04 RX ORDER — ACETAMINOPHEN 500 MG
975 TABLET ORAL
Refills: 0 | Status: DISCONTINUED | OUTPATIENT
Start: 2023-09-04 | End: 2023-09-06

## 2023-09-04 RX ORDER — OXYCODONE HYDROCHLORIDE 5 MG/1
5 TABLET ORAL ONCE
Refills: 0 | Status: DISCONTINUED | OUTPATIENT
Start: 2023-09-04 | End: 2023-09-06

## 2023-09-04 RX ORDER — SIMETHICONE 80 MG/1
80 TABLET, CHEWABLE ORAL EVERY 4 HOURS
Refills: 0 | Status: DISCONTINUED | OUTPATIENT
Start: 2023-09-04 | End: 2023-09-06

## 2023-09-04 RX ORDER — HYDROCORTISONE 1 %
1 OINTMENT (GRAM) TOPICAL EVERY 6 HOURS
Refills: 0 | Status: DISCONTINUED | OUTPATIENT
Start: 2023-09-04 | End: 2023-09-06

## 2023-09-04 RX ORDER — PRAMOXINE HYDROCHLORIDE 150 MG/15G
1 AEROSOL, FOAM RECTAL EVERY 4 HOURS
Refills: 0 | Status: DISCONTINUED | OUTPATIENT
Start: 2023-09-04 | End: 2023-09-06

## 2023-09-04 RX ORDER — FAMOTIDINE 10 MG/ML
20 INJECTION INTRAVENOUS ONCE
Refills: 0 | Status: COMPLETED | OUTPATIENT
Start: 2023-09-04 | End: 2023-09-04

## 2023-09-04 RX ORDER — TETANUS TOXOID, REDUCED DIPHTHERIA TOXOID AND ACELLULAR PERTUSSIS VACCINE, ADSORBED 5; 2.5; 8; 8; 2.5 [IU]/.5ML; [IU]/.5ML; UG/.5ML; UG/.5ML; UG/.5ML
0.5 SUSPENSION INTRAMUSCULAR ONCE
Refills: 0 | Status: DISCONTINUED | OUTPATIENT
Start: 2023-09-04 | End: 2023-09-06

## 2023-09-04 RX ORDER — MAGNESIUM HYDROXIDE 400 MG/1
30 TABLET, CHEWABLE ORAL
Refills: 0 | Status: DISCONTINUED | OUTPATIENT
Start: 2023-09-04 | End: 2023-09-06

## 2023-09-04 RX ORDER — AER TRAVELER 0.5 G/1
1 SOLUTION RECTAL; TOPICAL EVERY 4 HOURS
Refills: 0 | Status: DISCONTINUED | OUTPATIENT
Start: 2023-09-04 | End: 2023-09-06

## 2023-09-04 RX ORDER — SODIUM CHLORIDE 9 MG/ML
3 INJECTION INTRAMUSCULAR; INTRAVENOUS; SUBCUTANEOUS EVERY 8 HOURS
Refills: 0 | Status: DISCONTINUED | OUTPATIENT
Start: 2023-09-04 | End: 2023-09-06

## 2023-09-04 RX ORDER — CITRIC ACID/SODIUM CITRATE 300-500 MG
15 SOLUTION, ORAL ORAL EVERY 6 HOURS
Refills: 0 | Status: DISCONTINUED | OUTPATIENT
Start: 2023-09-04 | End: 2023-09-05

## 2023-09-04 RX ADMIN — Medication 125 MILLIUNIT(S)/MIN: at 23:21

## 2023-09-04 RX ADMIN — Medication 30 MILLIGRAM(S): at 23:11

## 2023-09-04 RX ADMIN — Medication 4 MILLIUNIT(S)/MIN: at 19:39

## 2023-09-04 RX ADMIN — FAMOTIDINE 20 MILLIGRAM(S): 10 INJECTION INTRAVENOUS at 21:47

## 2023-09-04 NOTE — OB PROVIDER H&P - ASSESSMENT
27yo F  @38+3 here with increasingly painful contractions requesting an epidural. Patient has made cervical change from 2 to 3cm. Patient admitted in early labor    Plan  - Admit to L&D. Routine Labs. IVF.  - Augment with pitocin  - Fetus: cat 1 tracing. VTX. EFW extrapolated to 3175g by sono. Continuous EFM. No concerns.  - Prenatal issues: none  - h/o small PPH, not transfused with blood  - GBS neg, ampicillin not indicated  - Pain: epidural PRN    Patient discussed with attending physician, Dr. Lyons and [facility]  Dr. Garcia.    JOVITA Diallo, PGY3

## 2023-09-04 NOTE — OB PROVIDER H&P - NSHPPHYSICALEXAM_GEN_ALL_CORE
T(C): 37.2 (09-04-23 @ 17:33), Max: 37.2 (09-04-23 @ 05:46)  HR: 102 (09-04-23 @ 18:02) (100 - 114)  BP: 119/65 (09-04-23 @ 17:33) (105/61 - 119/65)  RR: 16 (09-04-23 @ 17:33) (16 - 16)  SpO2: 93% (09-04-23 @ 18:02) (93% - 99%)    Gen: NAD  CV: RRR  Pulm: breathing comfortably on RA  Abd: gravid, nontender  Extr: moving all extremities with ease  – VE: 3/50/-3  – FHT Cat I: baseline 150, mod variability, +accels, -decels  – [place]: q3-5min  – Sono: vertex

## 2023-09-04 NOTE — OB PROVIDER TRIAGE NOTE - NSOBPROVIDERNOTE_OBGYN_ALL_OB_FT
29yo F GP @    Plan  - Admit to L&D. Routine Labs. IVF.  - IOL w/  - Fetus: cat 1 tracing. VTX. EFW extrapolated to g by sono. Continuous EFM. No concerns.  - Prenatal issues: none  - GBS   - Pain: epidural PRN    Patient discussed with attending physician, Dr. Cristian Diallo, PGY3 27yo F  @38+3 coming in with irregular, mildly painful contractions since last night. She was previously closed in the office and is now 2/50/-3. Patient is overall comfortable and does not feel every contraction picked up on tocometry. Patient in early labor and prefers to labor at home. NST reactive.    Plan  - Discharge home with return precautions: regular painful contractions q5 min, leaking of fluid, vaginal bleeding, or decreased fetal movement.  - Patient advised to ambulate, use heat, massage, or take Tylenol for pain relief    Patient discussed with attending physician, Dr. Lyons.    JOVITA Diallo, PGY3

## 2023-09-04 NOTE — OB PROVIDER TRIAGE NOTE - ATTENDING COMMENTS
Pt in early labor.  membranes intact.  reassuring fetal status.      stable for d/c  fm and labor precautions    Anali Lyons  OB attg

## 2023-09-04 NOTE — OB PROVIDER H&P - NSLOWPPHRISK_OBGYN_A_OB
negative... No previous uterine incision/[place] Pregnancy/Less than or equal to 4 previous vaginal births

## 2023-09-04 NOTE — OB RN TRIAGE NOTE - FALL HARM RISK - UNIVERSAL INTERVENTIONS
Bed in lowest position, wheels locked, appropriate side rails in place/Call bell, personal items and telephone in reach/Instruct patient to call for assistance before getting out of bed or chair/Non-slip footwear when patient is out of bed/[place] to call system/Physically safe environment - no spills, clutter or unnecessary equipment/Purposeful Proactive Rounding/Room/bathroom lighting operational, light cord in reach

## 2023-09-04 NOTE — OB RN DELIVERY SUMMARY - NS_SEPSISRSKCALC_OBGYN_ALL_OB_FT
EOS calculated successfully. EOS Risk Factor: 0.5/1000 live births ([facility] national incidence); GA=38w3d; Temp=99; ROM=1.9; GBS='Negative'; Antibiotics='No antibiotics or any antibiotics < 2 hrs prior to birth'

## 2023-09-04 NOTE — OB PROVIDER TRIAGE NOTE - HISTORY OF PRESENT ILLNESS
HPI: 27yo F GP @  presents for . +FM. -LOF. -CTXs. -VB. Pt denies any other concerns.    PNC: Denies prenatal issues.   GBS   EFW g by sono.    OBHx:   GynHx: denies hx STIs, fibroids, polyps, cysts  PMH: denies hx clotting or bleeding disorders, HTN, DM  PSH: denies  PFH: no hx congenital disorders, bleeding/clotting disorders  Psych: denies   Social: denies etoh, smoking, drugs. Safe at home/in relationship.  Meds: PNV   Allergies: NKDA  Will accept blood transfusions? Yes      Gen: NAD  CV: RRR  Pulm: breathing comfortably on RA  Abd: gravid, nontender  Extr: moving all extremities with ease  – FS:   – Spec: pooling, nitrazine, ferning, bleeding,  (lesions if patient with HSV2 history)  – VE: //  – FHT Cat I: baseline 1, mod variability, +accels, -decels  – [place]: qmin  – Sono: vertex     HPI: 27yo F  @38+3 coming in with irregular contractions since 9pm last night. They did not become painful until 11pm. Patient says the is uncomfortable but not to the point that she would like an epidural. Patient was closed on exam last Thursday in the office. +FM. -LOF. -VB. Pt denies any other concerns.    PNC: Denies prenatal issues.  GBS neg  EFW 3175g by sono 2 weeks ago.    OBHx:   FT  3259g   FT  2948g   FT  3328g    GynHx: denies hx STIs, fibroids, polyps, cysts  PMH: Hypothyroidism. Denies hx clotting or bleeding disorders, HTN, DM  PSH: denies  PFH: no hx congenital disorders, bleeding/clotting disorders  Psych: denies  Social: denies etoh, smoking, drugs. Safe at home/in relationship.  Meds: PNV, Synthroid 125mcg  Allergies: NKDA  Will accept blood transfusions? Yes HPI: 29yo F  @38+3 coming in with irregular contractions since 9pm last night. They did not become painful until 11pm. Patient says the is uncomfortable but not to the point that she would like an epidural. Patient was closed on exam last Thursday in the office. +FM. -LOF. -VB. Pt denies any other concerns.    PNC: Denies prenatal issues.  GBS neg  EFW 3175g by sono 2 weeks ago.    OBHx:  2018 FT  3259g,  w/  s/p methergine series   FT  2948g,  w/  s/p rcyto   FT  3328g    GynHx: denies hx STIs, fibroids, polyps, cysts  PMH: Hypothyroidism. Denies hx clotting or bleeding disorders, HTN, DM  PSH: denies  PFH: no hx congenital disorders, bleeding/clotting disorders  Psych: denies  Social: denies etoh, smoking, drugs. Safe at home/in relationship.  Meds: PNV, Synthroid 125mcg  Allergies: NKDA  Will accept blood transfusions? Yes

## 2023-09-04 NOTE — OB PROVIDER DELIVERY SUMMARY - NSPROVIDERDELIVERYNOTE_OBGYN_ALL_OB_FT
Pt presented in early labor requesting pain management.  Received epidural and was augmented with pitocin and arom.  Progressed to FD.  Pushed to undergo .,  Baby boy.  First degree laceration repaired with 2.0 vicryl rapide.  Uterine atony noted and improved with bimanual massage, im pitocin and cytotec.  .  Rectal sphincter intact.    Anali Lyons  OB attg

## 2023-09-04 NOTE — OB PROVIDER H&P - HISTORY OF PRESENT ILLNESS
HPI: 29yo F  @38+3 coming in with increasingly painful contraction asking for an epidural. Patient was seen in triage this morning and she was 2/50/-3. At that time pain was tolerable. +FM. -LOF. -VB. Pt denies any other concerns.    PNC: Denies prenatal issues.  GBS neg  EFW 3175g by sono 2 weeks ago.    OBHx:   FT  3259g,  w/  s/p methergine series   FT  2948g,  w/  s/p rcyto   FT  3328g    GynHx: denies hx STIs, fibroids, polyps, cysts  PMH: Hypothyroidism. Denies hx clotting or bleeding disorders, HTN, DM  PSH: denies  PFH: no hx congenital disorders, bleeding/clotting disorders  Psych: denies  Social: denies etoh, smoking, drugs. Safe at home/in relationship.  Meds: PNV, Synthroid 125mcg  Allergies: NKDA  Will accept blood transfusions? Yes

## 2023-09-04 NOTE — OB PROVIDER H&P - ATTENDING COMMENTS
P3 presenting in early labor.  Membranes intact.  Reassuring fetal status.    Admit for pain control  consents  labs  efm/toco  ivh/clears  epidural   for pitocin after epidural    Anali Lyons  OB attg

## 2023-09-04 NOTE — OB PROVIDER LABOR PROGRESS NOTE - ASSESSMENT
arom clear  second iv being placed  type and cross  cont to titrate pitocin    Anali Lyons  OB attg
high fowlers -  will start pushing shortly    Anali Lyons  OB attg
reposition  cont current management    Anali Lyons  OB attg

## 2023-09-04 NOTE — OB PROVIDER TRIAGE NOTE - NSHPPHYSICALEXAM_GEN_ALL_CORE
T(C): 37.2 (09-04-23 @ 05:56), Max: 37.2 (09-04-23 @ 05:46)  HR: 107 (09-04-23 @ 07:00) (104 - 114)  BP: 105/61 (09-04-23 @ 05:55) (105/61 - 105/61)  RR: 16 (09-04-23 @ 05:46) (16 - 16)  SpO2: 97% (09-04-23 @ 07:00) (96% - 99%)    Gen: NAD  CV: RRR  Pulm: breathing comfortably on RA  Abd: gravid, nontender  Extr: moving all extremities with ease  – VE: 2/50/-3  – FHT Cat I: baseline 150, mod variability, +accels, -decels  – [place]: q3min  – Sono: vertex

## 2023-09-04 NOTE — OB RN DELIVERY SUMMARY - NSSELHIDDEN_OBGYN_ALL_OB_FT
[NS_DeliveryAttending1_OBGYN_ALL_OB_FT:[id number]],[NS_DeliveryRN_OBGYN_ALL_OB_FT:[id number]]

## 2023-09-05 ENCOUNTER — TRANSCRIPTION ENCOUNTER (OUTPATIENT)
Age: 28
End: 2023-09-05

## 2023-09-05 LAB
KLEIHAUER-BETKE CALCULATION: 0 % — SIGNIFICANT CHANGE UP (ref 0–0.3)
T PALLIDUM AB TITR SER: NEGATIVE — SIGNIFICANT CHANGE UP

## 2023-09-05 RX ORDER — LEVOTHYROXINE SODIUM 125 MCG
125 TABLET ORAL DAILY
Refills: 0 | Status: DISCONTINUED | OUTPATIENT
Start: 2023-09-05 | End: 2023-09-06

## 2023-09-05 RX ORDER — IBUPROFEN 200 MG
600 TABLET ORAL EVERY 6 HOURS
Refills: 0 | Status: DISCONTINUED | OUTPATIENT
Start: 2023-09-05 | End: 2023-09-06

## 2023-09-05 RX ORDER — IBUPROFEN 200 MG
1 TABLET ORAL
Qty: 0 | Refills: 0 | DISCHARGE
Start: 2023-09-05

## 2023-09-05 RX ORDER — ACETAMINOPHEN 500 MG
3 TABLET ORAL
Qty: 0 | Refills: 0 | DISCHARGE
Start: 2023-09-05

## 2023-09-05 RX ADMIN — Medication 600 MILLIGRAM(S): at 06:12

## 2023-09-05 RX ADMIN — Medication 975 MILLIGRAM(S): at 21:45

## 2023-09-05 RX ADMIN — Medication 125 MICROGRAM(S): at 05:37

## 2023-09-05 RX ADMIN — Medication 975 MILLIGRAM(S): at 15:29

## 2023-09-05 RX ADMIN — Medication 600 MILLIGRAM(S): at 11:46

## 2023-09-05 RX ADMIN — Medication 600 MILLIGRAM(S): at 05:19

## 2023-09-05 RX ADMIN — Medication 600 MILLIGRAM(S): at 18:35

## 2023-09-05 RX ADMIN — Medication 600 MILLIGRAM(S): at 12:45

## 2023-09-05 RX ADMIN — Medication 975 MILLIGRAM(S): at 08:02

## 2023-09-05 RX ADMIN — SODIUM CHLORIDE 3 MILLILITER(S): 9 INJECTION INTRAMUSCULAR; INTRAVENOUS; SUBCUTANEOUS at 21:45

## 2023-09-05 RX ADMIN — Medication 975 MILLIGRAM(S): at 09:00

## 2023-09-05 RX ADMIN — Medication 975 MILLIGRAM(S): at 21:10

## 2023-09-05 NOTE — PROGRESS NOTE ADULT - ASSESSMENT
A: 29 yo P3 s/p uncomplicated   breastfeeding established  normal PP course  VSS    P  Rev'd discharge instructions  Enc'd NSAIDs prn for cramping  Rev'd breastfeeding   Pt to be discharged home

## 2023-09-05 NOTE — DISCHARGE NOTE OB - CARE PROVIDER_API CALL
Chidi Valencia  Obstetrics and Gynecology  [address] [place] [address], [address]  [place] [address]  Phone: [phone]  Fax: [phone]  Follow Up Time:

## 2023-09-05 NOTE — DISCHARGE NOTE OB - CARE PLAN
1 Principal Discharge DX:	 (normal spontaneous vaginal delivery)  Assessment and plan of treatment:	29 yo P3 s/p   normal PP course   stable for discharge

## 2023-09-05 NOTE — DISCHARGE NOTE OB - CARE PROVIDERS DIRECT ADDRESSES
normal... Well appearing, well nourished, awake, alert, oriented to person, place, time/situation and in no apparent distress. ,DirectAddress_Unknown

## 2023-09-05 NOTE — DISCHARGE NOTE OB - PATIENT PORTAL LINK FT
You can access the FollowMyHealth Patient Portal offered by [facility] by registering at the following website: [email or web address]. By joining [employer]’s FollowMyHealth portal, you will also be able to view your health information using other applications (apps) compatible with our system.

## 2023-09-05 NOTE — DISCHARGE NOTE OB - MEDICATION SUMMARY - MEDICATIONS TO TAKE
I will START or STAY ON the medications listed below when I get home from the hospital:     mg oral tablet  -- 1 tab(s) by mouth every 6 hours  -- Indication: For Supervision of other normal pregnancy, antepartum    Tylenol Regular Strength 325 mg oral tablet  -- 3 tab(s) by mouth every 6 hours  -- Indication: For Supervision of other normal pregnancy, antepartum

## 2023-09-06 VITALS
TEMPERATURE: 98 F | DIASTOLIC BLOOD PRESSURE: 74 MMHG | RESPIRATION RATE: 18 BRPM | HEART RATE: 59 BPM | OXYGEN SATURATION: 98 % | SYSTOLIC BLOOD PRESSURE: 112 MMHG

## 2023-09-06 DIAGNOSIS — Z3A.38 38 WEEKS GESTATION OF PREGNANCY: ICD-10-CM

## 2023-09-06 DIAGNOSIS — O47.1 FALSE LABOR AT OR AFTER 37 COMPLETED WEEKS OF GESTATION: ICD-10-CM

## 2023-09-06 DIAGNOSIS — O99.283 ENDOCRINE, NUTRITIONAL AND METABOLIC DISEASES COMPLICATING PREGNANCY, THIRD TRIMESTER: ICD-10-CM

## 2023-09-06 DIAGNOSIS — E03.9 HYPOTHYROIDISM, UNSPECIFIED: ICD-10-CM

## 2023-09-06 PROCEDURE — 85025 COMPLETE CBC W/AUTO DIFF WBC: CPT

## 2023-09-06 PROCEDURE — 86901 BLOOD TYPING SEROLOGIC RH(D): CPT

## 2023-09-06 PROCEDURE — 86870 RBC ANTIBODY IDENTIFICATION: CPT

## 2023-09-06 PROCEDURE — 86780 TREPONEMA PALLIDUM: CPT

## 2023-09-06 PROCEDURE — 36415 COLL VENOUS BLD VENIPUNCTURE: CPT

## 2023-09-06 PROCEDURE — 86900 BLOOD TYPING SEROLOGIC ABO: CPT

## 2023-09-06 PROCEDURE — 59050 FETAL MONITOR W/REPORT: CPT

## 2023-09-06 PROCEDURE — 86850 RBC ANTIBODY SCREEN: CPT

## 2023-09-06 PROCEDURE — 85460 HEMOGLOBIN FETAL: CPT

## 2023-09-06 RX ADMIN — Medication 125 MICROGRAM(S): at 05:52

## 2023-09-06 RX ADMIN — Medication 600 MILLIGRAM(S): at 05:52

## 2023-09-06 RX ADMIN — Medication 975 MILLIGRAM(S): at 09:40

## 2023-09-06 RX ADMIN — Medication 975 MILLIGRAM(S): at 09:06

## 2023-09-06 RX ADMIN — Medication 600 MILLIGRAM(S): at 12:21

## 2023-09-06 RX ADMIN — Medication 600 MILLIGRAM(S): at 11:48

## 2023-09-06 RX ADMIN — Medication 600 MILLIGRAM(S): at 06:24

## 2023-09-06 NOTE — PROGRESS NOTE ADULT - SUBJECTIVE AND OBJECTIVE BOX
S: Patient doing well. No complaints. Minimal lochia. Pain controlled.    O: Vital Signs Last 24 Hrs  T(C): 36.4 (06 Sep 2023 05:27), Max: 36.7 (05 Sep 2023 17:00)  T(F): 97.5 (06 Sep 2023 05:27), Max: 98.1 (05 Sep 2023 17:00)  HR: 59 (06 Sep 2023 05:27) (59 - 77)  BP: 112/74 (06 Sep 2023 05:27) (101/69 - 117/80)  BP(mean): --  RR: 18 (06 Sep 2023 05:27) (18 - 18)  SpO2: 98% (06 Sep 2023 05:27) (97% - 99%)    Parameters below as of 06 Sep 2023 05:27  Patient On (Oxygen Delivery Method): room air        Gen: NAD  Abd: soft, Nontender, Nondistended, fundus firm  Ext: no tenderness, mild edema    Labs:                        10.9   8.10  )-----------( 243      ( 04 Sep 2023 18:35 )             33.1       A: 28y PPD#2 s/p  doing well.    Plan:  Routine postpartum care  Encouraged out of bed  Regular diet    Discharge  ELY Valencia MD
Pt seen at bedside. Doing well, denies complaints.  Reports mild lochia. +cramping with breastfeeding  Ambulating and voiding without issue   Desires discharge      GENERAL: NAD, Resting in bed  CHEST/LUNG: Non labored respirations   BREAST: Soft, filling, no evidence of poor latch   HEART: Regular rate and rhythm  ABDOMEN: Uterus firm and 3 FB below umbilicus  PERINEUM: Laceration intact   EXTREMITIES:  No clubbing, cyanosis, or edema  NERVOUS SYSTEM:  Alert & Oriented X3

## 2023-09-07 ENCOUNTER — APPOINTMENT (OUTPATIENT)
Dept: OBGYN | Facility: CLINIC | Age: 28
End: 2023-09-07

## 2023-09-27 PROBLEM — E03.9 HYPOTHYROIDISM, UNSPECIFIED: Chronic | Status: ACTIVE | Noted: 2023-09-04

## 2023-10-09 ENCOUNTER — APPOINTMENT (OUTPATIENT)
Dept: OBGYN | Facility: CLINIC | Age: 28
End: 2023-10-09
Payer: COMMERCIAL

## 2023-10-09 PROCEDURE — 0503F POSTPARTUM CARE VISIT: CPT

## 2023-10-19 NOTE — PATIENT PROFILE OB - NS PRO ABUSE SCREEN AFRAID ANYONE YN
Intubation    Date/Time: 10/19/2023 10:20 AM    Performed by: Joaquin Baeza CRNA  Authorized by: Zach Szymanski DO    Intubation:     Induction:  Intravenous    Intubated:  Postinduction    Mask Ventilation:  Easy mask    Attempts:  1    Attempted By:  CRNA    Method of Intubation:  Direct    Blade:  Jeffrey 2    Laryngeal View Grade: Grade I - full view of cords      Difficult Airway Encountered?: No      Complications:  None    Airway Device:  Oral endotracheal tube    Airway Device Size:  7.0    Style/Cuff Inflation:  Cuffed (inflated to minimal occlusive pressure)    Inflation Amount (mL):  8    Tube secured:  21    Secured at:  The lips    Placement Verified By:  Capnometry    Complicating Factors:  None    Findings Post-Intubation:  BS equal bilateral       no

## 2023-11-09 ENCOUNTER — APPOINTMENT (OUTPATIENT)
Dept: OBGYN | Facility: CLINIC | Age: 28
End: 2023-11-09

## 2023-11-16 ENCOUNTER — APPOINTMENT (OUTPATIENT)
Dept: OBGYN | Facility: CLINIC | Age: 28
End: 2023-11-16

## 2023-11-17 ENCOUNTER — APPOINTMENT (OUTPATIENT)
Dept: OBGYN | Facility: CLINIC | Age: 28
End: 2023-11-17
Payer: COMMERCIAL

## 2023-11-17 PROCEDURE — 99213 OFFICE O/P EST LOW 20 MIN: CPT

## 2023-11-30 ENCOUNTER — NON-APPOINTMENT (OUTPATIENT)
Age: 28
End: 2023-11-30

## 2023-12-01 ENCOUNTER — OUTPATIENT (OUTPATIENT)
Dept: OUTPATIENT SERVICES | Facility: HOSPITAL | Age: 28
LOS: 1 days | End: 2023-12-01
Payer: COMMERCIAL

## 2023-12-01 ENCOUNTER — APPOINTMENT (OUTPATIENT)
Dept: ULTRASOUND IMAGING | Facility: IMAGING CENTER | Age: 28
End: 2023-12-01
Payer: COMMERCIAL

## 2023-12-01 DIAGNOSIS — R60.0 LOCALIZED EDEMA: ICD-10-CM

## 2023-12-01 PROCEDURE — 93971 EXTREMITY STUDY: CPT | Mod: 26,LT

## 2023-12-01 PROCEDURE — 93971 EXTREMITY STUDY: CPT

## 2024-01-15 NOTE — PATIENT PROFILE OB - BREASTFEEDING PROVIDES MATERNAL HEALTH BENEFITS, DECREASED PREMENOPAUSAL BREAST CANCER, OVARIAN CANCER AND TYPE II DIABETES MELLITUS
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  Medication changes per orders.  Psychological condition  will be reassessed in 4 weeks    Will start lamictal/qutiapine.   Genesight done today   Multiple meds tried more ssri with problems         Will go to caplyta as a next move.  
Will start seroquel 25/50 if needed.  
Statement Selected

## 2024-01-30 ENCOUNTER — APPOINTMENT (OUTPATIENT)
Dept: OBGYN | Facility: CLINIC | Age: 29
End: 2024-01-30
Payer: COMMERCIAL

## 2024-01-30 PROCEDURE — 99212 OFFICE O/P EST SF 10 MIN: CPT

## 2024-02-26 NOTE — OB RN DELIVERY SUMMARY - BABY A: APGAR 1 MIN RESP RATE, DELIVERY
If any labs or imaging was ordered I willl post results in the next few days.  Please note I usually wait for all results to come back before writing a result note unless results are urgent.  If you have any questions feel free to send me a message through patient portal or call the office     If any imaging was ordered today you can call central scheduling to schedule               (1) weak, irregular

## 2024-05-06 ENCOUNTER — APPOINTMENT (OUTPATIENT)
Dept: DERMATOLOGY | Facility: CLINIC | Age: 29
End: 2024-05-06

## 2024-06-04 ENCOUNTER — APPOINTMENT (OUTPATIENT)
Dept: DERMATOLOGY | Facility: CLINIC | Age: 29
End: 2024-06-04

## 2024-07-21 ENCOUNTER — NON-APPOINTMENT (OUTPATIENT)
Age: 29
End: 2024-07-21

## 2024-08-06 NOTE — OB PROVIDER H&P - PRO FEEDING PLAN INFANT OB
INTERVAL HPI/OVERNIGHT EVENTS:  Pt seen and examined at bedside. No acute overnight events or complaints.    VITAL SIGNS:  T(F): 97.9 (24 @ 05:38)  HR: 53 (24 @ 05:38)  BP: 157/69 (24 @ 05:38)  RR: 18 (24 @ 05:38)  SpO2: 100% (24 @ 05:38)  Wt(kg): --    PHYSICAL EXAM:    Constitutional: WDWN, NAD  HEENT: PERRL, EOMI, sclera non-icteric, neck supple, trachea midline, no masses, no JVD, MMM, good dentition  Respiratory: CTA b/l, good air entry b/l, no wheezing, no rhonchi, no rales, without accessory muscle use and no intercostal retractions  Cardiovascular: RRR, normal S1S2, no M/R/G  Gastrointestinal: soft, NTND, no masses palpable, BS normal  Extremities: Warm, well perfused, pulses equal bilateral upper and lower extremities, no edema, no clubbing. Capillary refill <2 sec  Neurological: AAOx3, CN Grossly intact  Skin: Normal temperature, warm, dry    MEDICATIONS  (STANDING):  atorvastatin 10 milliGRAM(s) Oral at bedtime  chlorhexidine 2% Cloths 1 Application(s) Topical <User Schedule>  darunavir 800 mG/cobicistat 150 mG/emtricitabine 200 mG/tenofovir alafenamide 10 mG (SYMTUZA) 1 Tablet(s) Oral daily  dextrose 5%. 1000 milliLiter(s) (100 mL/Hr) IV Continuous <Continuous>  dextrose 5%. 1000 milliLiter(s) (50 mL/Hr) IV Continuous <Continuous>  dextrose 50% Injectable 25 Gram(s) IV Push once  dextrose 50% Injectable 25 Gram(s) IV Push once  dextrose 50% Injectable 12.5 Gram(s) IV Push once  glucagon  Injectable 1 milliGRAM(s) IntraMuscular once  insulin glargine Injectable (LANTUS) 10 Unit(s) SubCutaneous at bedtime  insulin lispro (ADMELOG) corrective regimen sliding scale   SubCutaneous three times a day before meals  insulin lispro (ADMELOG) corrective regimen sliding scale   SubCutaneous at bedtime  lactated ringers. 1000 milliLiter(s) (75 mL/Hr) IV Continuous <Continuous>  metoprolol succinate ER 25 milliGRAM(s) Oral daily  piperacillin/tazobactam IVPB.. 3.375 Gram(s) IV Intermittent every 12 hours  tamsulosin 0.4 milliGRAM(s) Oral at bedtime    MEDICATIONS  (PRN):  acetaminophen     Tablet .. 650 milliGRAM(s) Oral every 6 hours PRN Temp greater or equal to 38C (100.4F), Mild Pain (1 - 3)  dextrose Oral Gel 15 Gram(s) Oral once PRN Blood Glucose LESS THAN 70 milliGRAM(s)/deciliter      Allergies    No Known Allergies    Intolerances        LABS:                        11.0   16.79 )-----------( 222      ( 05 Aug 2024 10:40 )             34.3     08-05    137  |  103  |  22  ----------------------------<  239<H>  4.4   |  21<L>  |  1.28    Ca    9.7      05 Aug 2024 10:40  Phos  2.0     08-05  Mg     2.00     08-05    TPro  8.0  /  Alb  3.6  /  TBili  0.6  /  DBili  x   /  AST  10  /  ALT  5   /  AlkPhos  99  08-05    PT/INR - ( 04 Aug 2024 21:58 )   PT: 13.1 sec;   INR: 1.16 ratio         PTT - ( 04 Aug 2024 21:58 )  PTT:29.7 sec  Urinalysis Basic - ( 05 Aug 2024 11:17 )    Color: Orange / Appearance: Cloudy / S.033 / pH: x  Gluc: x / Ketone: Trace mg/dL  / Bili: Negative / Urobili: 1.0 mg/dL   Blood: x / Protein: 100 mg/dL / Nitrite: Negative   Leuk Esterase: Small / RBC: 1660 /HPF / WBC 48 /HPF   Sq Epi: x / Non Sq Epi: 0 /HPF / Bacteria: Negative /HPF        RADIOLOGY & ADDITIONAL TESTS:  Reviewed      ******************  Authored By: Jonathan Ladd (MS3)  MS Teams Preferred  ****************** initiation of breastfeeding/breast milk feeding   INTERVAL HPI/OVERNIGHT EVENTS:  Pt seen and examined at bedside. No acute overnight events or complaints.    VITAL SIGNS:  T(F): 97.9 (24 @ 05:38)  HR: 53 (24 @ 05:38)  BP: 157/69 (24 @ 05:38)  RR: 18 (24 @ 05:38)  SpO2: 100% (24 @ 05:38)  Wt(kg): --    PHYSICAL EXAM:    Constitutional: WDWN, NAD  HEENT: PERRL, EOMI, sclera non-icteric, neck supple, trachea midline, no masses, no JVD, MMM, good dentition  Respiratory: CTA b/l, good air entry b/l, no wheezing, no rhonchi, no rales, without accessory muscle use and no intercostal retractions  Cardiovascular: RRR, normal S1S2, no M/R/G  Gastrointestinal: soft, NTND, no masses palpable, BS normal  Extremities: Warm, well perfused, pulses equal bilateral upper and lower extremities, no edema, no clubbing. Capillary refill <2 sec  Neurological: AAOx3, CN Grossly intact  Skin: Normal temperature, warm, dry    MEDICATIONS  (STANDING):  atorvastatin 10 milliGRAM(s) Oral at bedtime  chlorhexidine 2% Cloths 1 Application(s) Topical <User Schedule>  darunavir 800 mG/cobicistat 150 mG/emtricitabine 200 mG/tenofovir alafenamide 10 mG (SYMTUZA) 1 Tablet(s) Oral daily  dextrose 5%. 1000 milliLiter(s) (100 mL/Hr) IV Continuous <Continuous>  dextrose 5%. 1000 milliLiter(s) (50 mL/Hr) IV Continuous <Continuous>  dextrose 50% Injectable 25 Gram(s) IV Push once  dextrose 50% Injectable 25 Gram(s) IV Push once  dextrose 50% Injectable 12.5 Gram(s) IV Push once  glucagon  Injectable 1 milliGRAM(s) IntraMuscular once  insulin glargine Injectable (LANTUS) 10 Unit(s) SubCutaneous at bedtime  insulin lispro (ADMELOG) corrective regimen sliding scale   SubCutaneous three times a day before meals  insulin lispro (ADMELOG) corrective regimen sliding scale   SubCutaneous at bedtime  lactated ringers. 1000 milliLiter(s) (75 mL/Hr) IV Continuous <Continuous>  metoprolol succinate ER 25 milliGRAM(s) Oral daily  piperacillin/tazobactam IVPB.. 3.375 Gram(s) IV Intermittent every 12 hours  tamsulosin 0.4 milliGRAM(s) Oral at bedtime    MEDICATIONS  (PRN):  acetaminophen     Tablet .. 650 milliGRAM(s) Oral every 6 hours PRN Temp greater or equal to 38C (100.4F), Mild Pain (1 - 3)  dextrose Oral Gel 15 Gram(s) Oral once PRN Blood Glucose LESS THAN 70 milliGRAM(s)/deciliter      Allergies    No Known Allergies    Intolerances        LABS:                                   10.0   11.42 )-----------( 216      ( 06 Aug 2024 06:16 )             30.9     08-06    139  |  105  |  16  ----------------------------<  99  3.8   |  22  |  1.19    Ca    9.0      06 Aug 2024 06:16  Phos  2.7     08-06  Mg     1.90     08-06    TPro  6.8  /  Alb  3.2<L>  /  TBili  0.6  /  DBili  x   /  AST  15  /  ALT  10  /  AlkPhos  74  08-06      PT/INR - ( 04 Aug 2024 21:58 )   PT: 13.1 sec;   INR: 1.16 ratio         PTT - ( 04 Aug 2024 21:58 )  PTT:29.7 sec  Urinalysis Basic - ( 05 Aug 2024 11:17 )    Color: Orange / Appearance: Cloudy / S.033 / pH: x  Gluc: x / Ketone: Trace mg/dL  / Bili: Negative / Urobili: 1.0 mg/dL   Blood: x / Protein: 100 mg/dL / Nitrite: Negative   Leuk Esterase: Small / RBC: 1660 /HPF / WBC 48 /HPF   Sq Epi: x / Non Sq Epi: 0 /HPF / Bacteria: Negative /HPF        RADIOLOGY & ADDITIONAL TESTS:  Reviewed      ******************  Authored By: Jonathan Ladd (MS3)  MS Teams Preferred  ******************   INTERVAL HPI/OVERNIGHT EVENTS:  Pt seen and examined at bedside. No acute overnight events or complaints. Patient states the forrester is uncomfortable    VITAL SIGNS:  T(F): 97.9 (24 @ 05:38)  HR: 53 (24 @ 05:38)  BP: 157/69 (24 @ 05:38)  RR: 18 (24 @ 05:38)  SpO2: 100% (24 @ 05:38)  Wt(kg): --    PHYSICAL EXAM:    Constitutional: WDWN, NAD  HEENT: EOMI, sclera non-icteric,  Respiratory: CTA b/l, good air entry b/l, no wheezing, no rhonchi, no rales, without accessory muscle use and no intercostal retractions  Cardiovascular: RRR, normal S1S2, no M/R/G  Gastrointestinal: soft, NTND  : Forrester in place, No blood surrounding urethral meatus  Extremities: Warm, well perfused, no edema  Neurological: AAOx4, CN Grossly intact  Skin: Normal temperature, warm, dry    MEDICATIONS  (STANDING):  atorvastatin 10 milliGRAM(s) Oral at bedtime  chlorhexidine 2% Cloths 1 Application(s) Topical <User Schedule>  darunavir 800 mG/cobicistat 150 mG/emtricitabine 200 mG/tenofovir alafenamide 10 mG (SYMTUZA) 1 Tablet(s) Oral daily  dextrose 5%. 1000 milliLiter(s) (100 mL/Hr) IV Continuous <Continuous>  dextrose 5%. 1000 milliLiter(s) (50 mL/Hr) IV Continuous <Continuous>  dextrose 50% Injectable 25 Gram(s) IV Push once  dextrose 50% Injectable 25 Gram(s) IV Push once  dextrose 50% Injectable 12.5 Gram(s) IV Push once  glucagon  Injectable 1 milliGRAM(s) IntraMuscular once  insulin glargine Injectable (LANTUS) 10 Unit(s) SubCutaneous at bedtime  insulin lispro (ADMELOG) corrective regimen sliding scale   SubCutaneous three times a day before meals  insulin lispro (ADMELOG) corrective regimen sliding scale   SubCutaneous at bedtime  lactated ringers. 1000 milliLiter(s) (75 mL/Hr) IV Continuous <Continuous>  metoprolol succinate ER 25 milliGRAM(s) Oral daily  piperacillin/tazobactam IVPB.. 3.375 Gram(s) IV Intermittent every 12 hours  tamsulosin 0.4 milliGRAM(s) Oral at bedtime    MEDICATIONS  (PRN):  acetaminophen     Tablet .. 650 milliGRAM(s) Oral every 6 hours PRN Temp greater or equal to 38C (100.4F), Mild Pain (1 - 3)  dextrose Oral Gel 15 Gram(s) Oral once PRN Blood Glucose LESS THAN 70 milliGRAM(s)/deciliter      Allergies    No Known Allergies    Intolerances        LABS:                                   10.0   11.42 )-----------( 216      ( 06 Aug 2024 06:16 )             30.9     08-06    139  |  105  |  16  ----------------------------<  99  3.8   |  22  |  1.19    Ca    9.0      06 Aug 2024 06:16  Phos  2.7     08-06  Mg     1.90     08-06    TPro  6.8  /  Alb  3.2<L>  /  TBili  0.6  /  DBili  x   /  AST  15  /  ALT  10  /  AlkPhos  74  08-06      PT/INR - ( 04 Aug 2024 21:58 )   PT: 13.1 sec;   INR: 1.16 ratio         PTT - ( 04 Aug 2024 21:58 )  PTT:29.7 sec  Urinalysis Basic - ( 05 Aug 2024 11:17 )    Color: Orange / Appearance: Cloudy / S.033 / pH: x  Gluc: x / Ketone: Trace mg/dL  / Bili: Negative / Urobili: 1.0 mg/dL   Blood: x / Protein: 100 mg/dL / Nitrite: Negative   Leuk Esterase: Small / RBC: 1660 /HPF / WBC 48 /HPF   Sq Epi: x / Non Sq Epi: 0 /HPF / Bacteria: Negative /HPF        RADIOLOGY & ADDITIONAL TESTS:  Reviewed      ******************  Authored By: Jonathan Ladd (MS3)  MS Teams Preferred  ******************

## 2024-10-31 NOTE — OB PROVIDER DELIVERY SUMMARY - NSEBL_OBGYN_ALL_OB_NU
Call Center TCM Note      Flowsheet Row Responses   [facility] patient discharged from? [facility]   Does the patient have one of the following disease processes/diagnoses(primary or secondary)? CHF   TCM attempt successful? Yes   Call start time 1131   Call end time 1138   List who call center can speak with pt   Meds reviewed with patient/caregiver? Yes   Is the patient having any side effects they believe may be caused by any medication additions or changes? No   Does the patient have all medications ordered at discharge? Yes   Is the patient taking all medications as directed (includes completed medication regime)? Yes   Comments Hospital f/u scheduled for 11- @ 12:45 pm.   Does the patient have an appointment with their PCP within 7-14 days of discharge? Yes   Has home health visited the patient within 72 hours of discharge? N/A   Pulse Ox monitoring None   Psychosocial issues? No   Did the patient receive a copy of their discharge instructions? Yes   Nursing interventions Reviewed instructions with patient   What is the patient's perception of their health status since discharge? Improving   Is the patient able to teach back signs and symptoms of worsening condition? (i.e. weight gain, shortness of air, etc.) Yes   Is the patient/caregiver able to teach back the hierarchy of who to call/visit for symptoms/problems? PCP, Specialist, Home health nurse, Urgent Care, ED, 911 Yes   CHF Zone this Call Green Zone   Green [facility] Patient reports doing well, Physical activity level is normal for you, No new or worsening shortness of breath   [facility] Interventions Meds as directed, Follow up visits planned   TCM call completed? Yes   Wrap up additional comments Pt reports improving some. Pt has all medications. Pt reports no sob at this time. Pt understands if sob worsens to return to ED.   Call end time 1138   Would this patient benefit from a Referral to [facility] Social Work? No   Is the patient interested in  additional calls from an ambulatory ? No             Maki Ibarra, NICOLAS    10/31/2024, 11:39 EDT         350

## 2024-11-15 ENCOUNTER — APPOINTMENT (OUTPATIENT)
Dept: ULTRASOUND IMAGING | Facility: CLINIC | Age: 29
End: 2024-11-15
Payer: COMMERCIAL

## 2024-11-15 ENCOUNTER — OUTPATIENT (OUTPATIENT)
Dept: OUTPATIENT SERVICES | Facility: HOSPITAL | Age: 29
LOS: 1 days | End: 2024-11-15
Payer: COMMERCIAL

## 2024-11-15 DIAGNOSIS — I89.1 LYMPHANGITIS: ICD-10-CM

## 2024-11-15 DIAGNOSIS — R10.10 UPPER ABDOMINAL PAIN, UNSPECIFIED: ICD-10-CM

## 2024-11-15 DIAGNOSIS — Z00.8 ENCOUNTER FOR OTHER GENERAL EXAMINATION: ICD-10-CM

## 2024-11-15 PROCEDURE — 76700 US EXAM ABDOM COMPLETE: CPT | Mod: 26

## 2024-11-15 PROCEDURE — 76536 US EXAM OF HEAD AND NECK: CPT | Mod: 26

## 2024-11-15 PROCEDURE — 76536 US EXAM OF HEAD AND NECK: CPT

## 2024-11-15 PROCEDURE — 76700 US EXAM ABDOM COMPLETE: CPT

## 2024-12-25 PROBLEM — F10.90 ALCOHOL USE: Status: ACTIVE | Noted: 2017-02-10

## 2025-03-27 ENCOUNTER — APPOINTMENT (OUTPATIENT)
Dept: OBGYN | Facility: CLINIC | Age: 30
End: 2025-03-27
Payer: COMMERCIAL

## 2025-03-27 PROCEDURE — 99395 PREV VISIT EST AGE 18-39: CPT

## 2025-03-27 PROCEDURE — 36415 COLL VENOUS BLD VENIPUNCTURE: CPT

## 2025-03-27 PROCEDURE — 99459 PELVIC EXAMINATION: CPT

## 2025-03-27 PROCEDURE — 96127 BRIEF EMOTIONAL/BEHAV ASSMT: CPT

## 2025-04-02 ENCOUNTER — APPOINTMENT (OUTPATIENT)
Dept: OBGYN | Facility: CLINIC | Age: 30
End: 2025-04-02
Payer: COMMERCIAL

## 2025-04-02 PROCEDURE — 76830 TRANSVAGINAL US NON-OB: CPT

## 2025-04-02 PROCEDURE — 58300 INSERT INTRAUTERINE DEVICE: CPT

## 2025-04-22 ENCOUNTER — APPOINTMENT (OUTPATIENT)
Dept: OBGYN | Facility: CLINIC | Age: 30
End: 2025-04-22
Payer: COMMERCIAL

## 2025-04-22 PROCEDURE — 76830 TRANSVAGINAL US NON-OB: CPT

## 2025-04-22 PROCEDURE — 99213 OFFICE O/P EST LOW 20 MIN: CPT | Mod: 25

## 2025-04-22 PROCEDURE — 76856 US EXAM PELVIC COMPLETE: CPT

## 2025-04-30 ENCOUNTER — APPOINTMENT (OUTPATIENT)
Dept: OBGYN | Facility: CLINIC | Age: 30
End: 2025-04-30

## 2025-05-12 ENCOUNTER — APPOINTMENT (OUTPATIENT)
Dept: OBGYN | Facility: CLINIC | Age: 30
End: 2025-05-12

## 2025-05-21 ENCOUNTER — APPOINTMENT (OUTPATIENT)
Dept: COLORECTAL SURGERY | Facility: CLINIC | Age: 30
End: 2025-05-21

## 2025-05-29 ENCOUNTER — APPOINTMENT (OUTPATIENT)
Dept: OBGYN | Facility: CLINIC | Age: 30
End: 2025-05-29
Payer: COMMERCIAL

## 2025-05-29 PROCEDURE — 76830 TRANSVAGINAL US NON-OB: CPT

## 2025-05-29 PROCEDURE — 99459 PELVIC EXAMINATION: CPT

## 2025-05-29 PROCEDURE — 99213 OFFICE O/P EST LOW 20 MIN: CPT | Mod: 25

## 2025-08-09 ENCOUNTER — TRANSCRIPTION ENCOUNTER (OUTPATIENT)
Age: 30
End: 2025-08-09

## 2025-08-26 ENCOUNTER — APPOINTMENT (OUTPATIENT)
Dept: OBGYN | Facility: CLINIC | Age: 30
End: 2025-08-26
Payer: COMMERCIAL

## 2025-08-26 PROCEDURE — 99213 OFFICE O/P EST LOW 20 MIN: CPT | Mod: 25

## 2025-08-26 PROCEDURE — 76830 TRANSVAGINAL US NON-OB: CPT

## 2025-08-26 PROCEDURE — 36415 COLL VENOUS BLD VENIPUNCTURE: CPT
